# Patient Record
Sex: FEMALE | Race: WHITE | Employment: FULL TIME | URBAN - METROPOLITAN AREA
[De-identification: names, ages, dates, MRNs, and addresses within clinical notes are randomized per-mention and may not be internally consistent; named-entity substitution may affect disease eponyms.]

---

## 2017-01-05 ENCOUNTER — TELEPHONE (OUTPATIENT)
Dept: ORTHOPEDIC SURGERY | Age: 47
End: 2017-01-05

## 2017-06-14 ENCOUNTER — TELEPHONE (OUTPATIENT)
Dept: ORTHOPEDIC SURGERY | Age: 47
End: 2017-06-14

## 2017-06-19 ENCOUNTER — TELEPHONE (OUTPATIENT)
Dept: ORTHOPEDIC SURGERY | Age: 47
End: 2017-06-19

## 2017-06-27 ENCOUNTER — HOSPITAL ENCOUNTER (OUTPATIENT)
Dept: PAIN MANAGEMENT | Age: 47
Discharge: HOME | End: 2017-06-27
Attending: PHYSICAL MEDICINE & REHABILITATION | Admitting: PHYSICAL MEDICINE & REHABILITATION

## 2017-06-27 VITALS
BODY MASS INDEX: 37.76 KG/M2 | TEMPERATURE: 98.2 F | DIASTOLIC BLOOD PRESSURE: 89 MMHG | WEIGHT: 200 LBS | HEIGHT: 61 IN | HEART RATE: 99 BPM | OXYGEN SATURATION: 100 % | SYSTOLIC BLOOD PRESSURE: 150 MMHG | RESPIRATION RATE: 18 BRPM

## 2017-06-27 LAB — PREGNANCY, URINE: NEGATIVE

## 2017-06-27 ASSESSMENT — PAIN - FUNCTIONAL ASSESSMENT: PAIN_FUNCTIONAL_ASSESSMENT: 0-10

## 2017-07-27 ENCOUNTER — OFFICE VISIT (OUTPATIENT)
Dept: ORTHOPEDIC SURGERY | Age: 47
End: 2017-07-27

## 2017-07-27 VITALS
DIASTOLIC BLOOD PRESSURE: 90 MMHG | BODY MASS INDEX: 37.75 KG/M2 | HEIGHT: 61 IN | WEIGHT: 199.96 LBS | SYSTOLIC BLOOD PRESSURE: 122 MMHG | HEART RATE: 114 BPM

## 2017-07-27 DIAGNOSIS — M54.16 LUMBAR RADICULITIS: ICD-10-CM

## 2017-07-27 DIAGNOSIS — M48.061 LUMBAR STENOSIS: Primary | ICD-10-CM

## 2017-07-27 PROCEDURE — 99214 OFFICE O/P EST MOD 30 MIN: CPT | Performed by: PHYSICIAN ASSISTANT

## 2017-08-01 ENCOUNTER — TELEPHONE (OUTPATIENT)
Dept: ORTHOPEDIC SURGERY | Age: 47
End: 2017-08-01

## 2017-08-07 ENCOUNTER — HOSPITAL ENCOUNTER (OUTPATIENT)
Dept: PAIN MANAGEMENT | Age: 47
Discharge: HOME | End: 2017-08-07
Attending: PHYSICAL MEDICINE & REHABILITATION | Admitting: PHYSICAL MEDICINE & REHABILITATION

## 2017-08-07 VITALS
TEMPERATURE: 98.1 F | RESPIRATION RATE: 16 BRPM | SYSTOLIC BLOOD PRESSURE: 144 MMHG | DIASTOLIC BLOOD PRESSURE: 98 MMHG | OXYGEN SATURATION: 98 % | HEART RATE: 98 BPM

## 2017-08-07 LAB — PREGNANCY, URINE: NEGATIVE

## 2017-08-07 ASSESSMENT — PAIN - FUNCTIONAL ASSESSMENT: PAIN_FUNCTIONAL_ASSESSMENT: 0-10

## 2017-08-24 ENCOUNTER — OFFICE VISIT (OUTPATIENT)
Dept: ORTHOPEDIC SURGERY | Age: 47
End: 2017-08-24

## 2017-08-24 VITALS
WEIGHT: 199.96 LBS | DIASTOLIC BLOOD PRESSURE: 99 MMHG | HEART RATE: 105 BPM | BODY MASS INDEX: 37.75 KG/M2 | HEIGHT: 61 IN | SYSTOLIC BLOOD PRESSURE: 144 MMHG

## 2017-08-24 DIAGNOSIS — M48.061 LUMBAR STENOSIS: Primary | ICD-10-CM

## 2017-08-24 DIAGNOSIS — M54.16 LUMBAR RADICULITIS: ICD-10-CM

## 2017-08-24 PROCEDURE — 99214 OFFICE O/P EST MOD 30 MIN: CPT | Performed by: PHYSICIAN ASSISTANT

## 2017-08-28 ENCOUNTER — OFFICE VISIT (OUTPATIENT)
Dept: ORTHOPEDIC SURGERY | Age: 47
End: 2017-08-28

## 2017-08-28 VITALS
WEIGHT: 199.96 LBS | HEART RATE: 105 BPM | HEIGHT: 61 IN | DIASTOLIC BLOOD PRESSURE: 95 MMHG | SYSTOLIC BLOOD PRESSURE: 148 MMHG | BODY MASS INDEX: 37.75 KG/M2

## 2017-08-28 DIAGNOSIS — M48.062 LUMBAR STENOSIS WITH NEUROGENIC CLAUDICATION: Primary | ICD-10-CM

## 2017-08-28 PROCEDURE — 99243 OFF/OP CNSLTJ NEW/EST LOW 30: CPT | Performed by: ORTHOPAEDIC SURGERY

## 2017-08-29 ENCOUNTER — TELEPHONE (OUTPATIENT)
Dept: ORTHOPEDIC SURGERY | Age: 47
End: 2017-08-29

## 2017-09-08 ENCOUNTER — HOSPITAL ENCOUNTER (OUTPATIENT)
Dept: PAIN MANAGEMENT | Age: 47
Discharge: HOME | End: 2017-09-08
Attending: PHYSICAL MEDICINE & REHABILITATION | Admitting: PHYSICAL MEDICINE & REHABILITATION

## 2017-09-08 VITALS
HEART RATE: 99 BPM | RESPIRATION RATE: 16 BRPM | BODY MASS INDEX: 37.76 KG/M2 | WEIGHT: 200 LBS | SYSTOLIC BLOOD PRESSURE: 155 MMHG | HEIGHT: 61 IN | DIASTOLIC BLOOD PRESSURE: 98 MMHG | OXYGEN SATURATION: 98 % | TEMPERATURE: 97.2 F

## 2017-09-08 LAB — PREGNANCY, URINE: NEGATIVE

## 2017-09-08 ASSESSMENT — PAIN DESCRIPTION - DESCRIPTORS: DESCRIPTORS: ACHING;CONSTANT;DULL

## 2017-09-08 ASSESSMENT — PAIN - FUNCTIONAL ASSESSMENT
PAIN_FUNCTIONAL_ASSESSMENT: 0-10
PAIN_FUNCTIONAL_ASSESSMENT: 0-10

## 2017-09-27 ENCOUNTER — OFFICE VISIT (OUTPATIENT)
Dept: ORTHOPEDIC SURGERY | Age: 47
End: 2017-09-27

## 2017-09-27 VITALS
HEART RATE: 99 BPM | HEIGHT: 61 IN | DIASTOLIC BLOOD PRESSURE: 83 MMHG | WEIGHT: 199.96 LBS | BODY MASS INDEX: 37.75 KG/M2 | SYSTOLIC BLOOD PRESSURE: 131 MMHG

## 2017-09-27 DIAGNOSIS — M48.061 LUMBAR STENOSIS: Primary | ICD-10-CM

## 2017-09-27 DIAGNOSIS — M54.16 LUMBAR RADICULITIS: ICD-10-CM

## 2017-09-27 PROCEDURE — 99214 OFFICE O/P EST MOD 30 MIN: CPT | Performed by: PHYSICIAN ASSISTANT

## 2017-09-29 ENCOUNTER — TELEPHONE (OUTPATIENT)
Dept: ORTHOPEDIC SURGERY | Age: 47
End: 2017-09-29

## 2017-10-05 ENCOUNTER — TELEPHONE (OUTPATIENT)
Dept: ORTHOPEDIC SURGERY | Age: 47
End: 2017-10-05

## 2017-10-05 NOTE — TELEPHONE ENCOUNTER
Faxed [facility] medical records for FCV/MS for 1/2016 to present to Felecia Garcia MD @ [phone] (continuation of care)

## 2017-10-20 ENCOUNTER — HOSPITAL ENCOUNTER (OUTPATIENT)
Dept: MAMMOGRAPHY | Age: 47
Discharge: HOME | End: 2017-10-20
Attending: SURGERY | Admitting: SURGERY

## 2017-10-20 ENCOUNTER — HOSPITAL ENCOUNTER (OUTPATIENT)
Dept: MAMMOGRAPHY | Age: 47
Discharge: HOME | End: 2017-10-20

## 2017-10-20 DIAGNOSIS — N63.0 BREAST LUMP: ICD-10-CM

## 2018-05-29 ENCOUNTER — OFFICE VISIT (OUTPATIENT)
Dept: ORTHOPEDIC SURGERY | Age: 48
End: 2018-05-29

## 2018-05-29 VITALS — BODY MASS INDEX: 37.75 KG/M2 | HEIGHT: 61 IN | WEIGHT: 199.96 LBS

## 2018-05-29 DIAGNOSIS — M25.80 SESAMOIDITIS: Primary | ICD-10-CM

## 2018-05-29 DIAGNOSIS — M79.671 FOOT PAIN, RIGHT: ICD-10-CM

## 2018-05-29 PROCEDURE — G8427 DOCREV CUR MEDS BY ELIG CLIN: HCPCS | Performed by: PHYSICIAN ASSISTANT

## 2018-05-29 PROCEDURE — G8417 CALC BMI ABV UP PARAM F/U: HCPCS | Performed by: PHYSICIAN ASSISTANT

## 2018-05-29 PROCEDURE — L4361 PNEUMA/VAC WALK BOOT PRE OTS: HCPCS | Performed by: PHYSICIAN ASSISTANT

## 2018-05-29 PROCEDURE — 4004F PT TOBACCO SCREEN RCVD TLK: CPT | Performed by: PHYSICIAN ASSISTANT

## 2018-05-29 PROCEDURE — 99213 OFFICE O/P EST LOW 20 MIN: CPT | Performed by: PHYSICIAN ASSISTANT

## 2018-05-29 RX ORDER — MELOXICAM 15 MG/1
15 TABLET ORAL DAILY
Qty: 30 TABLET | Refills: 1 | Status: SHIPPED | OUTPATIENT
Start: 2018-05-29 | End: 2018-11-01

## 2018-05-30 ENCOUNTER — TELEPHONE (OUTPATIENT)
Dept: ORTHOPEDIC SURGERY | Age: 48
End: 2018-05-30

## 2018-06-13 ENCOUNTER — OFFICE VISIT (OUTPATIENT)
Dept: ORTHOPEDIC SURGERY | Age: 48
End: 2018-06-13

## 2018-06-13 VITALS — HEIGHT: 61 IN | BODY MASS INDEX: 37.76 KG/M2 | WEIGHT: 200 LBS

## 2018-06-13 DIAGNOSIS — M25.80 SESAMOIDITIS: Primary | ICD-10-CM

## 2018-06-13 PROCEDURE — G8427 DOCREV CUR MEDS BY ELIG CLIN: HCPCS | Performed by: PODIATRIST

## 2018-06-13 PROCEDURE — G8417 CALC BMI ABV UP PARAM F/U: HCPCS | Performed by: PODIATRIST

## 2018-06-13 PROCEDURE — 4004F PT TOBACCO SCREEN RCVD TLK: CPT | Performed by: PODIATRIST

## 2018-06-13 PROCEDURE — 99203 OFFICE O/P NEW LOW 30 MIN: CPT | Performed by: PODIATRIST

## 2018-06-15 ENCOUNTER — HOSPITAL ENCOUNTER (OUTPATIENT)
Dept: MAMMOGRAPHY | Age: 48
Discharge: HOME | End: 2018-06-15
Attending: SURGERY | Admitting: SURGERY

## 2018-06-15 DIAGNOSIS — Z80.3 FAMILY HISTORY OF BREAST CANCER: ICD-10-CM

## 2018-06-15 DIAGNOSIS — N63.10 LUMP OF RIGHT BREAST: ICD-10-CM

## 2018-10-09 ENCOUNTER — OFFICE VISIT (OUTPATIENT)
Dept: ORTHOPEDIC SURGERY | Age: 48
End: 2018-10-09
Payer: COMMERCIAL

## 2018-10-09 VITALS — WEIGHT: 199.96 LBS | HEIGHT: 61 IN | BODY MASS INDEX: 37.75 KG/M2

## 2018-10-09 DIAGNOSIS — M25.522 ELBOW PAIN, LEFT: Primary | ICD-10-CM

## 2018-10-09 DIAGNOSIS — M77.12 LATERAL EPICONDYLITIS OF LEFT ELBOW: ICD-10-CM

## 2018-10-09 PROCEDURE — G8484 FLU IMMUNIZE NO ADMIN: HCPCS | Performed by: PHYSICIAN ASSISTANT

## 2018-10-09 PROCEDURE — G8427 DOCREV CUR MEDS BY ELIG CLIN: HCPCS | Performed by: PHYSICIAN ASSISTANT

## 2018-10-09 PROCEDURE — 4004F PT TOBACCO SCREEN RCVD TLK: CPT | Performed by: PHYSICIAN ASSISTANT

## 2018-10-09 PROCEDURE — G8417 CALC BMI ABV UP PARAM F/U: HCPCS | Performed by: PHYSICIAN ASSISTANT

## 2018-10-09 PROCEDURE — 99213 OFFICE O/P EST LOW 20 MIN: CPT | Performed by: PHYSICIAN ASSISTANT

## 2018-10-09 RX ORDER — METHYLPREDNISOLONE 4 MG/1
TABLET ORAL
Qty: 1 KIT | Refills: 0 | Status: SHIPPED | OUTPATIENT
Start: 2018-10-09 | End: 2018-11-01

## 2018-10-11 NOTE — PROGRESS NOTES
She will follow-up with Dr. Sterling Antoine in 2 weeks for his continued evaluation and care. Fabiola Abbott PA-C    * Please note that some or all of this record was generated using voice recognition software. If there are any questions about the content of this document, please contact me as some errors in transcription may have occurred.

## 2018-11-01 ENCOUNTER — OFFICE VISIT (OUTPATIENT)
Dept: ORTHOPEDIC SURGERY | Age: 48
End: 2018-11-01
Payer: COMMERCIAL

## 2018-11-01 VITALS — BODY MASS INDEX: 37.75 KG/M2 | HEIGHT: 61 IN | WEIGHT: 199.96 LBS

## 2018-11-01 DIAGNOSIS — S56.911A FOREARM STRAIN, RIGHT, INITIAL ENCOUNTER: ICD-10-CM

## 2018-11-01 DIAGNOSIS — R20.0 NUMBNESS AND TINGLING IN LEFT HAND: ICD-10-CM

## 2018-11-01 DIAGNOSIS — M77.12 LATERAL EPICONDYLITIS OF LEFT ELBOW: Primary | ICD-10-CM

## 2018-11-01 DIAGNOSIS — R20.2 NUMBNESS AND TINGLING IN LEFT HAND: ICD-10-CM

## 2018-11-01 PROCEDURE — 99203 OFFICE O/P NEW LOW 30 MIN: CPT | Performed by: ORTHOPAEDIC SURGERY

## 2018-11-01 PROCEDURE — G8484 FLU IMMUNIZE NO ADMIN: HCPCS | Performed by: ORTHOPAEDIC SURGERY

## 2018-11-01 PROCEDURE — 4004F PT TOBACCO SCREEN RCVD TLK: CPT | Performed by: ORTHOPAEDIC SURGERY

## 2018-11-01 PROCEDURE — G8427 DOCREV CUR MEDS BY ELIG CLIN: HCPCS | Performed by: ORTHOPAEDIC SURGERY

## 2018-11-01 PROCEDURE — G8417 CALC BMI ABV UP PARAM F/U: HCPCS | Performed by: ORTHOPAEDIC SURGERY

## 2018-11-01 RX ORDER — NAPROXEN 500 MG/1
500 TABLET ORAL 2 TIMES DAILY WITH MEALS
Qty: 60 TABLET | Refills: 3 | Status: SHIPPED | OUTPATIENT
Start: 2018-11-01 | End: 2019-12-18

## 2018-11-01 NOTE — PROGRESS NOTES
HISTORY OF PRESENT ILLNESS: The patient is a 52-year-old right-hand-dominant female who presents today for a new hand surgery and elbow specialty evaluation regarding both arms. For over a year and a half she's had numbness and tingling into the left hand especially when driving or holding objects on the left. About 3 months ago she started to have left lateral elbow pain and did go to the after hours clinic and was diagnosed with lateral epicondylitis. She is continued to have discomfort despite using a forearm strap. She also reports that on the right posterior oh ulnar aspect of the forearm she's been having some tenderness. She states that she was treated in the past with tendinopathy of the right forearm. In fact, I think I may have seen her a decade ago for this problem. She has been favoring the arm and overusing the right side. PAST MEDICAL HISTORY: Patient's medications, allergies, past medical, surgical, social and family histories were reviewed and updated as appropriate. ROS: Pertinent items are noted in HPI. Review of systems reviewed from patient history form dated on 10/9/2018 and available in the patient's chart under the media tab. Denies fever, chills, confusion, bowel/bladder active change. PHYSICAL EXAMINATION: Examination reveals a pleasant individual in no acute distress. There appears to be satisfactory pain-free range of motion, strength, and stability of the cervical spine, shoulders, wrists, and hands. Skin is intact without lymphadenopathy, discoloration, or abnormal temperature. There is intact, symmetric circulation in both upper extremities. At the left elbow, there is tenderness at the extremes of maximum flexion and extension and pain with firm pressure at the common extensor origin. There is no dramatic tenderness at the radial tunnel. Pain is reproduced with resisted wrist and long finger extension. There is no elbow instability or mechanical locking.  There is

## 2018-11-14 ENCOUNTER — HOSPITAL ENCOUNTER (OUTPATIENT)
Dept: OCCUPATIONAL THERAPY | Age: 48
Setting detail: THERAPIES SERIES
Discharge: HOME | End: 2018-11-14
Payer: COMMERCIAL

## 2018-11-14 PROCEDURE — 97035 APP MDLTY 1+ULTRASOUND EA 15: CPT | Performed by: OCCUPATIONAL THERAPIST

## 2018-11-14 PROCEDURE — 97110 THERAPEUTIC EXERCISES: CPT | Performed by: OCCUPATIONAL THERAPIST

## 2018-11-14 PROCEDURE — 97140 MANUAL THERAPY 1/> REGIONS: CPT | Performed by: OCCUPATIONAL THERAPIST

## 2018-11-14 NOTE — FLOWSHEET NOTE
Patient tolerated treatment well [] Patient limited by fatigue  [x] Patient limited by pain  [] Patient limited by other medical complications  [] Other:     Prognosis: [x] Good [] Fair  [] Poor    Patient Requires Follow-up: [x] Yes  [] No    PLAN:   [x] Continue per plan of care [] Alter current plan (see comments)  [] Plan of care initiated [] Hold pending MD visit [] Discharge    Electronically signed by: Trisha Cerna OTR/L, [address] [place] [address]

## 2018-11-16 ENCOUNTER — OFFICE VISIT (OUTPATIENT)
Dept: ORTHOPEDIC SURGERY | Age: 48
End: 2018-11-16
Payer: COMMERCIAL

## 2018-11-16 DIAGNOSIS — G56.02 CARPAL TUNNEL SYNDROME OF LEFT WRIST: Primary | ICD-10-CM

## 2018-11-16 PROCEDURE — 95908 NRV CNDJ TST 3-4 STUDIES: CPT | Performed by: PHYSICAL MEDICINE & REHABILITATION

## 2018-11-16 PROCEDURE — 95886 MUSC TEST DONE W/N TEST COMP: CPT | Performed by: PHYSICAL MEDICINE & REHABILITATION

## 2018-11-19 ENCOUNTER — OFFICE VISIT (OUTPATIENT)
Dept: ORTHOPEDIC SURGERY | Age: 48
End: 2018-11-19
Payer: COMMERCIAL

## 2018-11-19 VITALS — BODY MASS INDEX: 37.75 KG/M2 | HEIGHT: 61 IN | WEIGHT: 199.96 LBS

## 2018-11-19 DIAGNOSIS — R20.0 NUMBNESS AND TINGLING IN LEFT HAND: Primary | ICD-10-CM

## 2018-11-19 DIAGNOSIS — G56.02 LEFT CARPAL TUNNEL SYNDROME: ICD-10-CM

## 2018-11-19 DIAGNOSIS — R20.2 NUMBNESS AND TINGLING IN LEFT HAND: Primary | ICD-10-CM

## 2018-11-19 PROCEDURE — G8427 DOCREV CUR MEDS BY ELIG CLIN: HCPCS | Performed by: ORTHOPAEDIC SURGERY

## 2018-11-19 PROCEDURE — G8484 FLU IMMUNIZE NO ADMIN: HCPCS | Performed by: ORTHOPAEDIC SURGERY

## 2018-11-19 PROCEDURE — 99213 OFFICE O/P EST LOW 20 MIN: CPT | Performed by: ORTHOPAEDIC SURGERY

## 2018-11-19 PROCEDURE — L3908 WHO COCK-UP NONMOLDE PRE OTS: HCPCS | Performed by: ORTHOPAEDIC SURGERY

## 2018-11-19 PROCEDURE — 4004F PT TOBACCO SCREEN RCVD TLK: CPT | Performed by: ORTHOPAEDIC SURGERY

## 2018-11-19 PROCEDURE — G8417 CALC BMI ABV UP PARAM F/U: HCPCS | Performed by: ORTHOPAEDIC SURGERY

## 2018-11-26 PROBLEM — M51.26 DISPLACEMENT OF LUMBAR INTERVERTEBRAL DISC WITHOUT MYELOPATHY: Status: ACTIVE | Noted: 2018-11-26

## 2018-11-26 PROBLEM — M47.817 LUMBOSACRAL SPONDYLOSIS WITHOUT MYELOPATHY: Chronic | Status: ACTIVE | Noted: 2018-11-26

## 2018-11-26 PROBLEM — M47.817 LUMBOSACRAL SPONDYLOSIS WITHOUT MYELOPATHY: Status: ACTIVE | Noted: 2018-11-26

## 2018-11-26 PROBLEM — M51.379 DEGENERATION OF LUMBAR OR LUMBOSACRAL INTERVERTEBRAL DISC: Chronic | Status: ACTIVE | Noted: 2018-11-26

## 2018-11-26 PROBLEM — M51.26 DISPLACEMENT OF LUMBAR INTERVERTEBRAL DISC WITHOUT MYELOPATHY: Chronic | Status: ACTIVE | Noted: 2018-11-26

## 2018-11-26 PROBLEM — M51.379 DEGENERATION OF LUMBAR OR LUMBOSACRAL INTERVERTEBRAL DISC: Status: ACTIVE | Noted: 2018-11-26

## 2018-11-26 PROBLEM — M51.37 DEGENERATION OF LUMBAR OR LUMBOSACRAL INTERVERTEBRAL DISC: Status: ACTIVE | Noted: 2018-11-26

## 2018-11-26 PROBLEM — M51.37 DEGENERATION OF LUMBAR OR LUMBOSACRAL INTERVERTEBRAL DISC: Chronic | Status: ACTIVE | Noted: 2018-11-26

## 2018-11-28 ENCOUNTER — HOSPITAL ENCOUNTER (OUTPATIENT)
Dept: OCCUPATIONAL THERAPY | Age: 48
Setting detail: THERAPIES SERIES
Discharge: HOME | End: 2018-11-28
Payer: COMMERCIAL

## 2018-11-28 PROCEDURE — 97110 THERAPEUTIC EXERCISES: CPT | Performed by: OCCUPATIONAL THERAPIST

## 2018-11-28 PROCEDURE — 97035 APP MDLTY 1+ULTRASOUND EA 15: CPT | Performed by: OCCUPATIONAL THERAPIST

## 2018-11-28 PROCEDURE — 97140 MANUAL THERAPY 1/> REGIONS: CPT | Performed by: OCCUPATIONAL THERAPIST

## 2018-11-28 NOTE — FLOWSHEET NOTE
and NMR:  [x] (71040) Provided verbal/tactile cueing for activities related to strengthening, flexibility, endurance, ROM  for improvements in scapular, scapulothoracic and UE control with self care, reaching, carrying, lifting, house/yardwork, driving/computer work.    [] (65902) Provided verbal/tactile cueing for activities related to improving balance, coordination, kinesthetic sense, posture, motor skill, proprioception  to assist with  scapular, scapulothoracic and UE control with self care, reaching, carrying, lifting, house/yardwork, driving/computer work. [] Comments:    Therapeutic Activities:    [x] (61508 or 11807) Provided verbal/tactile cueing for activities related to improving balance, coordination, kinesthetic sense, posture, motor skill, proprioception and motor activation to allow for proper function of scapular, scapulothoracic and UE control with self care, carrying, lifting, driving/computer work  [] Comments:    Home Exercise Program:    [x] (51762) Reviewed/Progressed HEP activities related to strengthening, flexibility, endurance, ROM of scapular, scapulothoracic and UE control with self care, reaching, carrying, lifting, house/yardwork, driving/computer work  [] (89683) Reviewed/Progressed HEP activities related to improving balance, coordination, kinesthetic sense, posture, motor skill, proprioception of scapular, scapulothoracic and UE control with self care, reaching, carrying, lifting, house/yardwork, driving/computer work    [x] Comments:Forearm stretches, ice use. Avoid palm down lifting.     Manual Treatments:  PROM / STM / Oscillations-Mobs:  G-I, II, III, IV (PA's, Inf., Post.)  [x] (07920) Provided manual therapy to mobilize soft tissue/joints of cervical/CT, scapular GHJ and UE for the purpose of modulating pain, promoting relaxation,  increasing ROM, reducing/eliminating soft tissue swelling/inflammation/restriction, improving soft tissue extensibility and allowing for proper ROM

## 2018-12-05 ENCOUNTER — HOSPITAL ENCOUNTER (OUTPATIENT)
Dept: OCCUPATIONAL THERAPY | Age: 48
Setting detail: THERAPIES SERIES
Discharge: HOME | End: 2018-12-05
Payer: COMMERCIAL

## 2018-12-05 PROCEDURE — 97140 MANUAL THERAPY 1/> REGIONS: CPT | Performed by: OCCUPATIONAL THERAPIST

## 2018-12-05 PROCEDURE — 97035 APP MDLTY 1+ULTRASOUND EA 15: CPT | Performed by: OCCUPATIONAL THERAPIST

## 2018-12-05 PROCEDURE — 97110 THERAPEUTIC EXERCISES: CPT | Performed by: OCCUPATIONAL THERAPIST

## 2018-12-12 ENCOUNTER — HOSPITAL ENCOUNTER (OUTPATIENT)
Dept: OCCUPATIONAL THERAPY | Age: 48
Setting detail: THERAPIES SERIES
Discharge: HOME | End: 2018-12-12
Payer: COMMERCIAL

## 2018-12-12 DIAGNOSIS — M77.12 LATERAL EPICONDYLITIS OF LEFT ELBOW: Primary | ICD-10-CM

## 2018-12-12 PROCEDURE — 97140 MANUAL THERAPY 1/> REGIONS: CPT | Performed by: OCCUPATIONAL THERAPIST

## 2018-12-12 PROCEDURE — 97110 THERAPEUTIC EXERCISES: CPT | Performed by: OCCUPATIONAL THERAPIST

## 2018-12-12 PROCEDURE — 97035 APP MDLTY 1+ULTRASOUND EA 15: CPT | Performed by: OCCUPATIONAL THERAPIST

## 2018-12-12 RX ORDER — DEXAMETHASONE SODIUM PHOSPHATE 4 MG/ML
INJECTION, SOLUTION INTRA-ARTICULAR; INTRALESIONAL; INTRAMUSCULAR; INTRAVENOUS; SOFT TISSUE
Qty: 30 ML | Refills: 0 | Status: SHIPPED | OUTPATIENT
Start: 2018-12-12 | End: 2019-12-18

## 2018-12-12 NOTE — FLOWSHEET NOTE
2# x10 2# x10   Pilates ring   Pulse x15 Pulse x15 Pulse x15             Therapeutic Exercise and NMR:  [x] (13542) Provided verbal/tactile cueing for activities related to strengthening, flexibility, endurance, ROM  for improvements in scapular, scapulothoracic and UE control with self care, reaching, carrying, lifting, house/yardwork, driving/computer work.    [] (38893) Provided verbal/tactile cueing for activities related to improving balance, coordination, kinesthetic sense, posture, motor skill, proprioception  to assist with  scapular, scapulothoracic and UE control with self care, reaching, carrying, lifting, house/yardwork, driving/computer work. [] Comments:    Therapeutic Activities:    [x] (54809 or 99755) Provided verbal/tactile cueing for activities related to improving balance, coordination, kinesthetic sense, posture, motor skill, proprioception and motor activation to allow for proper function of scapular, scapulothoracic and UE control with self care, carrying, lifting, driving/computer work  [] Comments:    Home Exercise Program:    [x] (41103) Reviewed/Progressed HEP activities related to strengthening, flexibility, endurance, ROM of scapular, scapulothoracic and UE control with self care, reaching, carrying, lifting, house/yardwork, driving/computer work  [] (15499) Reviewed/Progressed HEP activities related to improving balance, coordination, kinesthetic sense, posture, motor skill, proprioception of scapular, scapulothoracic and UE control with self care, reaching, carrying, lifting, house/yardwork, driving/computer work    [x] Comments:Forearm stretches, ice use. Avoid palm down lifting.     Manual Treatments:  PROM / STM / Oscillations-Mobs:  G-I, II, III, IV (PA's, Inf., Post.)  [x] (64062) Provided manual therapy to mobilize soft tissue/joints of cervical/CT, scapular GHJ and UE for the purpose of modulating pain, promoting relaxation,  increasing ROM, reducing/eliminating soft tissue swelling/inflammation/restriction, improving soft tissue extensibility and allowing for proper ROM for normal function with self care, reaching, carrying, lifting, house/yardwork, driving/computer work  [x] Comments: Extensor compartment stretches, radial musculature massage    Splinting:  [] Fabrication of:   [] (61317) Checkout for orthotic/prosthetic use, established patient   [] (85963) Orthotic management and training (fitting and assessment)  [] Comments:    Charges:  Timed Code Treatment Minutes: 40   Total Treatment Minutes: 40     [] EVAL (LOW) 59147   [] OT Re-eval (13513)  [] EVAL (MOD) 45415   [] EVAL (HIGH) 96391       [x] Carmina (70987) x  1   [] OSTVS(57832)  [] NMR (11479) x      [] Estim (attended) (77091)   [x] Manual ([id number]) x  1    [x] US (30320)  [] TA (83497) x      [] Paraffin (21080)  [] ADL  (01042) x     [] Splint/L code:    [] Estim (unattended) (37035)  [] Other:    GOALS: Long Term Goals to be achieved in 6  weeks, including patient directed goals to address identified performance deficits:  1) Pt to be independent in graded HEP progression with a good level of effort and compliance. 2) Pt to report a score of 40 % or less on the Quick DASH disability questionnaire for increased performance with carrying, moving, and handling objects. 3) Pt will demonstrate increased ROM of elbow by 20 pain free degrees for improved performance of reaching, typing  4) Pt will demonstrate an increase in gross grasp strength to 75% of uninvolved hand for improvement of driving, household chores  5) Pt will have a decrease in pain to 4/10 with use to facilitate improvement in strength, movement, function, and ADLs. Progression Towards Functional goals:   [] Patient is progressing as expected towards functional goals listed. [] Progression is slowed due to complexities listed. [] Progression has been slowed due to co-morbidities.   [x] Plan just implemented, too soon to assess goals progression  []

## 2018-12-17 ENCOUNTER — HOSPITAL ENCOUNTER (OUTPATIENT)
Dept: OCCUPATIONAL THERAPY | Age: 48
Setting detail: THERAPIES SERIES
Discharge: HOME | End: 2018-12-17
Payer: COMMERCIAL

## 2018-12-17 PROCEDURE — 97033 APP MDLTY 1+IONTPHRSIS EA 15: CPT | Performed by: OCCUPATIONAL THERAPIST

## 2018-12-17 NOTE — FLOWSHEET NOTE
[address] [place], [address]                Hand Therapy Daily Treatment Note  Date:  2018    Patient: Familia Thomas   : 1970   MRN: [id number]  Referring Physician: Referring Practitioner: Dr. Murali Gimenez Diagnosis Information:  Diagnosis: L lateral epicondylitis (M77.12)                                          Treatment Diagnosis: M25.522                                    Insurance information: OT Insurance Information: [facility]    Date of Injury: Approx 2 months ago    Visit # Insurance Allowable   5 60, $0 copay     Date of Patient follow up with Physician: as needed in January    G-Code:  OT G-codes  Functional Assessment Tool Used: quick DASH - 59%  Functional Limitation: Carrying, moving and handling objects  Carrying, Moving and Handling Objects Current Status (): At least 40 percent but less than 60 percent impaired, limited or restricted  Carrying, Moving and Handling Objects Goal Status (): At least 20 percent but less than 40 percent impaired, limited or restricted    Progress Note: []  Yes  [x]  No  Next due by: Visit #10      Latex Allergy:  [x]NO      []YES    Preferred Language for Healthcare:   [x]English       []other:    Pain level:  2/10 resting, 7-8/10 with lifting     SUBJECTIVE:  Forgot to wear CF band today; some additional discomfort at elbow.     RESTRICTIONS/PRECAUTIONS: activity to tolerance     OBJECTIVE:          Date:  18   Objective Measures:          #2 R64 L30 pain free L30    L20 pain free                     Modalities:         MHP 10'        US  8' lat elbow 8' lat elbow 8' lat elbow 8' lat elbow    Ionto      24hr patch   Therapeutic Exercise, Activities, NMR:         HEP/pt education 25' review review      Radial musc massage/stretch  8' 8'      Wrist roller   6' 6'     [employer] - wrist

## 2018-12-19 ENCOUNTER — HOSPITAL ENCOUNTER (OUTPATIENT)
Dept: OCCUPATIONAL THERAPY | Age: 48
Setting detail: THERAPIES SERIES
Discharge: HOME | End: 2018-12-19
Payer: COMMERCIAL

## 2018-12-19 PROCEDURE — 97033 APP MDLTY 1+IONTPHRSIS EA 15: CPT | Performed by: OCCUPATIONAL THERAPIST

## 2018-12-19 NOTE — FLOWSHEET NOTE
x10     Pilates ring   Pulse x15 Pulse x15 Pulse x15                 Therapeutic Exercise and NMR:  [x] (23920) Provided verbal/tactile cueing for activities related to strengthening, flexibility, endurance, ROM  for improvements in scapular, scapulothoracic and UE control with self care, reaching, carrying, lifting, house/yardwork, driving/computer work.    [] (83758) Provided verbal/tactile cueing for activities related to improving balance, coordination, kinesthetic sense, posture, motor skill, proprioception  to assist with  scapular, scapulothoracic and UE control with self care, reaching, carrying, lifting, house/yardwork, driving/computer work. [] Comments:    Therapeutic Activities:    [x] (53708 or 03238) Provided verbal/tactile cueing for activities related to improving balance, coordination, kinesthetic sense, posture, motor skill, proprioception and motor activation to allow for proper function of scapular, scapulothoracic and UE control with self care, carrying, lifting, driving/computer work  [] Comments:    Home Exercise Program:    [x] (85118) Reviewed/Progressed HEP activities related to strengthening, flexibility, endurance, ROM of scapular, scapulothoracic and UE control with self care, reaching, carrying, lifting, house/yardwork, driving/computer work  [] (18950) Reviewed/Progressed HEP activities related to improving balance, coordination, kinesthetic sense, posture, motor skill, proprioception of scapular, scapulothoracic and UE control with self care, reaching, carrying, lifting, house/yardwork, driving/computer work    [x] Comments:Forearm stretches, ice use. Avoid palm down lifting.     Manual Treatments:  PROM / STM / Oscillations-Mobs:  G-I, II, III, IV (PA's, Inf., Post.)  [] (41500) Provided manual therapy to mobilize soft tissue/joints of cervical/CT, scapular GHJ and UE for the purpose of modulating pain, promoting relaxation,  increasing ROM, reducing/eliminating soft tissue twist    Treatment/Activity Tolerance:  [x] Patient tolerated treatment well [] Patient limited by fatigue  [] Patient limited by pain  [] Patient limited by other medical complications  [] Other:     Prognosis: [x] Good [] Fair  [] Poor    Patient Requires Follow-up: [x] Yes  [] No    PLAN:    [x] Continue per plan of care [] Alter current plan (see comments)  [] Plan of care initiated [] Hold pending MD visit [] Discharge    Electronically signed by: NEMO Clemons/AGATHA, [address] [place] [address]

## 2018-12-26 ENCOUNTER — HOSPITAL ENCOUNTER (OUTPATIENT)
Dept: OCCUPATIONAL THERAPY | Age: 48
Setting detail: THERAPIES SERIES
Discharge: HOME | End: 2018-12-26
Payer: COMMERCIAL

## 2018-12-26 PROCEDURE — 97033 APP MDLTY 1+IONTPHRSIS EA 15: CPT | Performed by: OCCUPATIONAL THERAPIST

## 2018-12-26 NOTE — FLOWSHEET NOTE
[address] [place], [address]                Hand Therapy Daily Treatment Note  Date:  2018    Patient: Familia Thomas   : 1970   MRN: [id number]  Referring Physician: Referring Practitioner: Dr. Murali Gimenez Diagnosis Information:  Diagnosis: L lateral epicondylitis (M77.12)                                          Treatment Diagnosis: M25.522                                    Insurance information: OT Insurance Information: [facility]    Date of Injury: Approx 2 months ago    Visit # Insurance Allowable   6 60, $0 copay     Date of Patient follow up with Physician: as needed in January    G-Code:  OT G-codes  Functional Assessment Tool Used: quick DASH - 59%  Functional Limitation: Carrying, moving and handling objects  Carrying, Moving and Handling Objects Current Status (): At least 40 percent but less than 60 percent impaired, limited or restricted  Carrying, Moving and Handling Objects Goal Status (): At least 20 percent but less than 40 percent impaired, limited or restricted    Progress Note: []  Yes  [x]  No  Next due by: Visit #10      Latex Allergy:  [x]NO      []YES    Preferred Language for Healthcare:   [x]English       []other:    Pain level:  2/10 resting, 7-8/10 with lifting     SUBJECTIVE:  No overall changes. Still most painful with lifting.     RESTRICTIONS/PRECAUTIONS: activity to tolerance     OBJECTIVE:          Date:  18   Objective Measures:            #2 R64 L30 pain free L30    L20 pain free  L22 pain free                         Modalities:           MHP 10'          US  8' lat elbow 8' lat elbow 8' lat elbow 8' lat elbow      Ionto      24hr patch 24hr patch 24 hr patch   Therapeutic Exercise, Activities, NMR:           HEP/pt education 25' review review        Radial musc massage/stretch progression  [] Other:     ASSESSMENT: Immediate pain with powerweb twist    Treatment/Activity Tolerance:  [x] Patient tolerated treatment well [] Patient limited by fatigue  [] Patient limited by pain  [] Patient limited by other medical complications  [] Other:     Prognosis: [x] Good [] Fair  [] Poor    Patient Requires Follow-up: [x] Yes  [] No    PLAN:    [x] Continue per plan of care [] Alter current plan (see comments)  [] Plan of care initiated [] Hold pending MD visit [] Discharge    Electronically signed by: Julio Dejesus OTR/L, [address] [place] [address]

## 2019-01-02 ENCOUNTER — HOSPITAL ENCOUNTER (OUTPATIENT)
Dept: OCCUPATIONAL THERAPY | Age: 49
Setting detail: THERAPIES SERIES
Discharge: HOME | End: 2019-01-02
Payer: COMMERCIAL

## 2019-01-02 PROCEDURE — 97033 APP MDLTY 1+IONTPHRSIS EA 15: CPT | Performed by: OCCUPATIONAL THERAPIST

## 2019-01-07 ENCOUNTER — HOSPITAL ENCOUNTER (OUTPATIENT)
Dept: OCCUPATIONAL THERAPY | Age: 49
Setting detail: THERAPIES SERIES
Discharge: HOME | End: 2019-01-07
Payer: COMMERCIAL

## 2019-01-07 PROCEDURE — 97033 APP MDLTY 1+IONTPHRSIS EA 15: CPT | Performed by: OCCUPATIONAL THERAPIST

## 2019-01-10 ENCOUNTER — HOSPITAL ENCOUNTER (OUTPATIENT)
Age: 49
Setting detail: OUTPATIENT SURGERY
Discharge: HOME | End: 2019-01-10
Attending: PAIN MEDICINE | Admitting: PAIN MEDICINE
Payer: COMMERCIAL

## 2019-01-10 VITALS
TEMPERATURE: 97.2 F | OXYGEN SATURATION: 96 % | RESPIRATION RATE: 20 BRPM | HEART RATE: 99 BPM | DIASTOLIC BLOOD PRESSURE: 99 MMHG | SYSTOLIC BLOOD PRESSURE: 153 MMHG

## 2019-01-10 LAB — PREGNANCY, URINE: NEGATIVE

## 2019-01-10 PROCEDURE — 2500000003 HC RX 250 WO HCPCS: Performed by: PAIN MEDICINE

## 2019-01-10 PROCEDURE — 99152 MOD SED SAME PHYS/QHP 5/>YRS: CPT | Performed by: PAIN MEDICINE

## 2019-01-10 PROCEDURE — 64636 DESTROY L/S FACET JNT ADDL: CPT | Performed by: PAIN MEDICINE

## 2019-01-10 PROCEDURE — 7100000011 HC PHASE II RECOVERY - ADDTL 15 MIN: Performed by: PAIN MEDICINE

## 2019-01-10 PROCEDURE — 64635 DESTROY LUMB/SAC FACET JNT: CPT | Performed by: PAIN MEDICINE

## 2019-01-10 PROCEDURE — 3600000012 HC SURGERY LEVEL 2 ADDTL 15MIN: Performed by: PAIN MEDICINE

## 2019-01-10 PROCEDURE — 3600000002 HC SURGERY LEVEL 2 BASE: Performed by: PAIN MEDICINE

## 2019-01-10 PROCEDURE — 7100000010 HC PHASE II RECOVERY - FIRST 15 MIN: Performed by: PAIN MEDICINE

## 2019-01-10 PROCEDURE — 2580000003 HC RX 258: Performed by: PAIN MEDICINE

## 2019-01-10 PROCEDURE — 84703 CHORIONIC GONADOTROPIN ASSAY: CPT

## 2019-01-10 PROCEDURE — 6360000002 HC RX W HCPCS: Performed by: PAIN MEDICINE

## 2019-01-10 PROCEDURE — 77003 FLUOROGUIDE FOR SPINE INJECT: CPT | Performed by: PAIN MEDICINE

## 2019-01-10 PROCEDURE — 2709999900 HC NON-CHARGEABLE SUPPLY: Performed by: PAIN MEDICINE

## 2019-01-10 RX ORDER — MIDAZOLAM HYDROCHLORIDE 5 MG/ML
INJECTION INTRAMUSCULAR; INTRAVENOUS PRN
Status: DISCONTINUED | OUTPATIENT
Start: 2019-01-10 | End: 2019-01-10 | Stop reason: HOSPADM

## 2019-01-10 RX ORDER — LIDOCAINE HYDROCHLORIDE 20 MG/ML
INJECTION, SOLUTION EPIDURAL; INFILTRATION; INTRACAUDAL; PERINEURAL PRN
Status: DISCONTINUED | OUTPATIENT
Start: 2019-01-10 | End: 2019-01-10 | Stop reason: HOSPADM

## 2019-01-10 RX ORDER — LIDOCAINE HYDROCHLORIDE 10 MG/ML
INJECTION, SOLUTION EPIDURAL; INFILTRATION; INTRACAUDAL; PERINEURAL PRN
Status: DISCONTINUED | OUTPATIENT
Start: 2019-01-10 | End: 2019-01-10 | Stop reason: HOSPADM

## 2019-01-10 RX ORDER — FENTANYL CITRATE 50 UG/ML
INJECTION, SOLUTION INTRAMUSCULAR; INTRAVENOUS PRN
Status: DISCONTINUED | OUTPATIENT
Start: 2019-01-10 | End: 2019-01-10 | Stop reason: HOSPADM

## 2019-01-10 RX ORDER — SODIUM CHLORIDE, SODIUM LACTATE, POTASSIUM CHLORIDE, CALCIUM CHLORIDE 600; 310; 30; 20 MG/100ML; MG/100ML; MG/100ML; MG/100ML
INJECTION, SOLUTION INTRAVENOUS CONTINUOUS
Status: DISCONTINUED | OUTPATIENT
Start: 2019-01-10 | End: 2019-01-10 | Stop reason: HOSPADM

## 2019-01-10 RX ADMIN — LIDOCAINE HYDROCHLORIDE 0.1 ML: 10 INJECTION, SOLUTION EPIDURAL; INFILTRATION; INTRACAUDAL; PERINEURAL at 11:48

## 2019-01-10 RX ADMIN — SODIUM CHLORIDE, POTASSIUM CHLORIDE, SODIUM LACTATE AND CALCIUM CHLORIDE: 600; 310; 30; 20 INJECTION, SOLUTION INTRAVENOUS at 11:48

## 2019-01-10 ASSESSMENT — PAIN - FUNCTIONAL ASSESSMENT
PAIN_FUNCTIONAL_ASSESSMENT: 0-10
PAIN_FUNCTIONAL_ASSESSMENT: PREVENTS OR INTERFERES SOME ACTIVE ACTIVITIES AND ADLS

## 2019-02-19 ENCOUNTER — HOSPITAL ENCOUNTER (OUTPATIENT)
Age: 49
Discharge: HOME | End: 2019-02-19
Payer: COMMERCIAL

## 2019-02-19 LAB
BASOPHILS ABSOLUTE: 0.1 K/UL (ref 0–0.2)
BASOPHILS RELATIVE PERCENT: 0.8 %
EOSINOPHILS ABSOLUTE: 0.1 K/UL (ref 0–0.6)
EOSINOPHILS RELATIVE PERCENT: 0.8 %
HCT VFR BLD CALC: 46.2 % (ref 36–48)
HEMOGLOBIN: 15.5 G/DL (ref 12–16)
LYMPHOCYTES ABSOLUTE: 3.1 K/UL (ref 1–5.1)
LYMPHOCYTES RELATIVE PERCENT: 29.6 %
MCH RBC QN AUTO: 28.3 PG (ref 26–34)
MCHC RBC AUTO-ENTMCNC: 33.6 G/DL (ref 31–36)
MCV RBC AUTO: 84.2 FL (ref 80–100)
MONOCYTES ABSOLUTE: 0.5 K/UL (ref 0–1.3)
MONOCYTES RELATIVE PERCENT: 4.9 %
NEUTROPHILS ABSOLUTE: 6.6 K/UL (ref 1.7–7.7)
NEUTROPHILS RELATIVE PERCENT: 63.9 %
PDW BLD-RTO: 14.2 % (ref 12.4–15.4)
PLATELET # BLD: 324 K/UL (ref 135–450)
PMV BLD AUTO: 7 FL (ref 5–10.5)
RBC # BLD: 5.49 M/UL (ref 4–5.2)
WBC # BLD: 10.4 K/UL (ref 4–11)

## 2019-02-19 PROCEDURE — 85025 COMPLETE CBC W/AUTO DIFF WBC: CPT

## 2019-02-19 PROCEDURE — 36415 COLL VENOUS BLD VENIPUNCTURE: CPT

## 2019-06-21 ENCOUNTER — HOSPITAL ENCOUNTER (OUTPATIENT)
Dept: WOMENS IMAGING | Age: 49
Discharge: HOME | End: 2019-06-21
Payer: COMMERCIAL

## 2019-06-21 DIAGNOSIS — Z12.31 ENCOUNTER FOR SCREENING MAMMOGRAM FOR MALIGNANT NEOPLASM OF BREAST: ICD-10-CM

## 2019-06-21 PROCEDURE — 77067 SCR MAMMO BI INCL CAD: CPT

## 2019-08-01 ENCOUNTER — OFFICE VISIT (OUTPATIENT)
Dept: ORTHOPEDIC SURGERY | Age: 49
End: 2019-08-01
Payer: COMMERCIAL

## 2019-08-01 VITALS — RESPIRATION RATE: 16 BRPM | HEIGHT: 61 IN | BODY MASS INDEX: 37.75 KG/M2 | WEIGHT: 199.96 LBS

## 2019-08-01 DIAGNOSIS — M25.531 RIGHT WRIST PAIN: Primary | ICD-10-CM

## 2019-08-01 DIAGNOSIS — M77.8 RIGHT WRIST TENDINITIS: ICD-10-CM

## 2019-08-01 PROCEDURE — 99214 OFFICE O/P EST MOD 30 MIN: CPT | Performed by: ORTHOPAEDIC SURGERY

## 2019-08-01 PROCEDURE — 4004F PT TOBACCO SCREEN RCVD TLK: CPT | Performed by: ORTHOPAEDIC SURGERY

## 2019-08-01 PROCEDURE — G8427 DOCREV CUR MEDS BY ELIG CLIN: HCPCS | Performed by: ORTHOPAEDIC SURGERY

## 2019-08-01 PROCEDURE — G8417 CALC BMI ABV UP PARAM F/U: HCPCS | Performed by: ORTHOPAEDIC SURGERY

## 2019-08-01 PROCEDURE — 20550 NJX 1 TENDON SHEATH/LIGAMENT: CPT | Performed by: ORTHOPAEDIC SURGERY

## 2019-09-27 ENCOUNTER — HOSPITAL ENCOUNTER (OUTPATIENT)
Age: 49
Discharge: HOME | End: 2019-09-27
Payer: COMMERCIAL

## 2019-09-27 LAB
A/G RATIO: 1.5 (ref 1.1–2.2)
ALBUMIN SERPL-MCNC: 4.7 G/DL (ref 3.4–5)
ALP BLD-CCNC: 96 U/L (ref 40–129)
ALT SERPL-CCNC: 9 U/L (ref 10–40)
ANION GAP SERPL CALCULATED.3IONS-SCNC: 15 MMOL/L (ref 3–16)
AST SERPL-CCNC: 15 U/L (ref 15–37)
BILIRUB SERPL-MCNC: 0.4 MG/DL (ref 0–1)
BUN BLDV-MCNC: 11 MG/DL (ref 7–20)
CALCIUM SERPL-MCNC: 9.8 MG/DL (ref 8.3–10.6)
CHLORIDE BLD-SCNC: 101 MMOL/L (ref 99–110)
CHOLESTEROL, TOTAL: 247 MG/DL (ref 0–199)
CO2: 25 MMOL/L (ref 21–32)
CREAT SERPL-MCNC: <0.5 MG/DL (ref 0.6–1.1)
GFR AFRICAN AMERICAN: >60
GFR NON-AFRICAN AMERICAN: >60
GLOBULIN: 3.1 G/DL
GLUCOSE BLD-MCNC: 127 MG/DL (ref 70–99)
HCT VFR BLD CALC: 46.4 % (ref 36–48)
HDLC SERPL-MCNC: 34 MG/DL (ref 40–60)
HEMOGLOBIN: 15.7 G/DL (ref 12–16)
LDL CHOLESTEROL CALCULATED: 154 MG/DL
LIPASE: 16 U/L (ref 13–60)
MCH RBC QN AUTO: 28.4 PG (ref 26–34)
MCHC RBC AUTO-ENTMCNC: 33.9 G/DL (ref 31–36)
MCV RBC AUTO: 83.7 FL (ref 80–100)
PDW BLD-RTO: 14 % (ref 12.4–15.4)
PLATELET # BLD: 336 K/UL (ref 135–450)
PMV BLD AUTO: 7.1 FL (ref 5–10.5)
POTASSIUM SERPL-SCNC: 4.2 MMOL/L (ref 3.5–5.1)
RBC # BLD: 5.54 M/UL (ref 4–5.2)
SODIUM BLD-SCNC: 141 MMOL/L (ref 136–145)
TOTAL PROTEIN: 7.8 G/DL (ref 6.4–8.2)
TRIGL SERPL-MCNC: 296 MG/DL (ref 0–150)
TSH SERPL DL<=0.05 MIU/L-ACNC: 0.82 UIU/ML (ref 0.27–4.2)
VLDLC SERPL CALC-MCNC: 59 MG/DL
WBC # BLD: 10.9 K/UL (ref 4–11)

## 2019-09-27 PROCEDURE — 83690 ASSAY OF LIPASE: CPT

## 2019-09-27 PROCEDURE — 80061 LIPID PANEL: CPT

## 2019-09-27 PROCEDURE — 85027 COMPLETE CBC AUTOMATED: CPT

## 2019-09-27 PROCEDURE — 80053 COMPREHEN METABOLIC PANEL: CPT

## 2019-09-27 PROCEDURE — 36415 COLL VENOUS BLD VENIPUNCTURE: CPT

## 2019-09-27 PROCEDURE — 84443 ASSAY THYROID STIM HORMONE: CPT

## 2019-11-21 ENCOUNTER — HOSPITAL ENCOUNTER (OUTPATIENT)
Dept: ULTRASOUND IMAGING | Age: 49
Discharge: HOME | End: 2019-11-21
Payer: COMMERCIAL

## 2019-11-21 DIAGNOSIS — R14.0 ABDOMINAL DISTENTION: ICD-10-CM

## 2019-11-21 PROCEDURE — 76705 ECHO EXAM OF ABDOMEN: CPT

## 2019-12-06 ENCOUNTER — HOSPITAL ENCOUNTER (OUTPATIENT)
Dept: NUCLEAR MEDICINE | Age: 49
Discharge: HOME | End: 2019-12-06
Payer: COMMERCIAL

## 2019-12-06 VITALS — WEIGHT: 205 LBS | HEIGHT: 60 IN | BODY MASS INDEX: 40.25 KG/M2

## 2019-12-06 DIAGNOSIS — R11.0 CHRONIC NAUSEA: ICD-10-CM

## 2019-12-06 DIAGNOSIS — K80.20 GALLSTONES: ICD-10-CM

## 2019-12-06 PROCEDURE — 2580000003 HC RX 258: Performed by: INTERNAL MEDICINE

## 2019-12-06 PROCEDURE — 3430000000 HC RX DIAGNOSTIC RADIOPHARMACEUTICAL: Performed by: INTERNAL MEDICINE

## 2019-12-06 PROCEDURE — A9537 TC99M MEBROFENIN: HCPCS | Performed by: INTERNAL MEDICINE

## 2019-12-06 PROCEDURE — 6360000002 HC RX W HCPCS: Performed by: INTERNAL MEDICINE

## 2019-12-06 PROCEDURE — 78227 HEPATOBIL SYST IMAGE W/DRUG: CPT

## 2019-12-06 RX ADMIN — SODIUM CHLORIDE 1.86 MCG: 9 INJECTION, SOLUTION INTRAVENOUS at 08:47

## 2019-12-06 RX ADMIN — Medication 5.5 MILLICURIE: at 07:50

## 2019-12-18 ENCOUNTER — PREP FOR PROCEDURE (OUTPATIENT)
Dept: SURGERY | Age: 49
End: 2019-12-18

## 2019-12-18 ENCOUNTER — INITIAL CONSULT (OUTPATIENT)
Dept: SURGERY | Age: 49
End: 2019-12-18
Payer: COMMERCIAL

## 2019-12-18 VITALS
SYSTOLIC BLOOD PRESSURE: 128 MMHG | DIASTOLIC BLOOD PRESSURE: 76 MMHG | BODY MASS INDEX: 40.52 KG/M2 | HEIGHT: 60 IN | WEIGHT: 206.4 LBS

## 2019-12-18 DIAGNOSIS — K80.10 CHRONIC CALCULOUS CHOLECYSTITIS: Primary | ICD-10-CM

## 2019-12-18 PROCEDURE — G8484 FLU IMMUNIZE NO ADMIN: HCPCS | Performed by: SURGERY

## 2019-12-18 PROCEDURE — G8427 DOCREV CUR MEDS BY ELIG CLIN: HCPCS | Performed by: SURGERY

## 2019-12-18 PROCEDURE — 99203 OFFICE O/P NEW LOW 30 MIN: CPT | Performed by: SURGERY

## 2019-12-18 PROCEDURE — G8417 CALC BMI ABV UP PARAM F/U: HCPCS | Performed by: SURGERY

## 2019-12-18 RX ORDER — HEPARIN SODIUM 5000 [USP'U]/ML
5000 INJECTION, SOLUTION INTRAVENOUS; SUBCUTANEOUS ONCE
Status: CANCELLED | OUTPATIENT
Start: 2019-12-18

## 2019-12-18 RX ORDER — SODIUM CHLORIDE 0.9 % (FLUSH) 0.9 %
10 SYRINGE (ML) INJECTION EVERY 12 HOURS SCHEDULED
Status: CANCELLED | OUTPATIENT
Start: 2019-12-18

## 2019-12-18 RX ORDER — SODIUM CHLORIDE 0.9 % (FLUSH) 0.9 %
10 SYRINGE (ML) INJECTION PRN
Status: CANCELLED | OUTPATIENT
Start: 2019-12-18

## 2020-01-07 ENCOUNTER — ANESTHESIA (OUTPATIENT)
Dept: OPERATING ROOM | Age: 50
End: 2020-01-07
Payer: COMMERCIAL

## 2020-01-07 ENCOUNTER — HOSPITAL ENCOUNTER (OUTPATIENT)
Age: 50
Setting detail: OUTPATIENT SURGERY
Discharge: HOME | End: 2020-01-07
Attending: SURGERY | Admitting: SURGERY
Payer: COMMERCIAL

## 2020-01-07 ENCOUNTER — ANESTHESIA EVENT (OUTPATIENT)
Dept: OPERATING ROOM | Age: 50
End: 2020-01-07
Payer: COMMERCIAL

## 2020-01-07 VITALS
WEIGHT: 200 LBS | SYSTOLIC BLOOD PRESSURE: 125 MMHG | RESPIRATION RATE: 10 BRPM | TEMPERATURE: 97.3 F | HEIGHT: 61 IN | BODY MASS INDEX: 37.76 KG/M2 | OXYGEN SATURATION: 95 % | HEART RATE: 84 BPM | DIASTOLIC BLOOD PRESSURE: 67 MMHG

## 2020-01-07 VITALS
SYSTOLIC BLOOD PRESSURE: 102 MMHG | DIASTOLIC BLOOD PRESSURE: 74 MMHG | RESPIRATION RATE: 22 BRPM | OXYGEN SATURATION: 97 %

## 2020-01-07 LAB
ALBUMIN SERPL-MCNC: 3.7 G/DL (ref 3.4–5)
ALP BLD-CCNC: 86 U/L (ref 40–129)
ALT SERPL-CCNC: 11 U/L (ref 10–40)
AST SERPL-CCNC: 11 U/L (ref 15–37)
BILIRUB SERPL-MCNC: 0.3 MG/DL (ref 0–1)
BILIRUBIN DIRECT: <0.2 MG/DL (ref 0–0.3)
BILIRUBIN, INDIRECT: ABNORMAL MG/DL (ref 0–1)
PREGNANCY, URINE: NEGATIVE
TOTAL PROTEIN: 6.7 G/DL (ref 6.4–8.2)

## 2020-01-07 PROCEDURE — 2500000003 HC RX 250 WO HCPCS: Performed by: NURSE ANESTHETIST, CERTIFIED REGISTERED

## 2020-01-07 PROCEDURE — 6360000002 HC RX W HCPCS: Performed by: NURSE ANESTHETIST, CERTIFIED REGISTERED

## 2020-01-07 PROCEDURE — 6360000002 HC RX W HCPCS: Performed by: SURGERY

## 2020-01-07 PROCEDURE — 7100000011 HC PHASE II RECOVERY - ADDTL 15 MIN: Performed by: SURGERY

## 2020-01-07 PROCEDURE — 3700000000 HC ANESTHESIA ATTENDED CARE: Performed by: SURGERY

## 2020-01-07 PROCEDURE — 36415 COLL VENOUS BLD VENIPUNCTURE: CPT

## 2020-01-07 PROCEDURE — 2500000003 HC RX 250 WO HCPCS: Performed by: SURGERY

## 2020-01-07 PROCEDURE — 2580000003 HC RX 258: Performed by: ANESTHESIOLOGY

## 2020-01-07 PROCEDURE — 3700000001 HC ADD 15 MINUTES (ANESTHESIA): Performed by: SURGERY

## 2020-01-07 PROCEDURE — 2580000003 HC RX 258: Performed by: SURGERY

## 2020-01-07 PROCEDURE — 84703 CHORIONIC GONADOTROPIN ASSAY: CPT

## 2020-01-07 PROCEDURE — 2709999900 HC NON-CHARGEABLE SUPPLY: Performed by: SURGERY

## 2020-01-07 PROCEDURE — 47562 LAPAROSCOPIC CHOLECYSTECTOMY: CPT | Performed by: SURGERY

## 2020-01-07 PROCEDURE — 3600000014 HC SURGERY LEVEL 4 ADDTL 15MIN: Performed by: SURGERY

## 2020-01-07 PROCEDURE — 3600000004 HC SURGERY LEVEL 4 BASE: Performed by: SURGERY

## 2020-01-07 PROCEDURE — 7100000001 HC PACU RECOVERY - ADDTL 15 MIN: Performed by: SURGERY

## 2020-01-07 PROCEDURE — 80076 HEPATIC FUNCTION PANEL: CPT

## 2020-01-07 PROCEDURE — 88304 TISSUE EXAM BY PATHOLOGIST: CPT

## 2020-01-07 PROCEDURE — 7100000000 HC PACU RECOVERY - FIRST 15 MIN: Performed by: SURGERY

## 2020-01-07 PROCEDURE — 7100000010 HC PHASE II RECOVERY - FIRST 15 MIN: Performed by: SURGERY

## 2020-01-07 PROCEDURE — 2720000010 HC SURG SUPPLY STERILE: Performed by: SURGERY

## 2020-01-07 RX ORDER — HYDRALAZINE HYDROCHLORIDE 20 MG/ML
5 INJECTION INTRAMUSCULAR; INTRAVENOUS EVERY 10 MIN PRN
Status: DISCONTINUED | OUTPATIENT
Start: 2020-01-07 | End: 2020-01-07 | Stop reason: HOSPADM

## 2020-01-07 RX ORDER — SODIUM CHLORIDE 0.9 % (FLUSH) 0.9 %
10 SYRINGE (ML) INJECTION PRN
Status: DISCONTINUED | OUTPATIENT
Start: 2020-01-07 | End: 2020-01-07 | Stop reason: HOSPADM

## 2020-01-07 RX ORDER — ONDANSETRON 2 MG/ML
INJECTION INTRAMUSCULAR; INTRAVENOUS PRN
Status: DISCONTINUED | OUTPATIENT
Start: 2020-01-07 | End: 2020-01-07 | Stop reason: SDUPTHER

## 2020-01-07 RX ORDER — ONDANSETRON 2 MG/ML
4 INJECTION INTRAMUSCULAR; INTRAVENOUS PRN
Status: DISCONTINUED | OUTPATIENT
Start: 2020-01-07 | End: 2020-01-07 | Stop reason: HOSPADM

## 2020-01-07 RX ORDER — ROCURONIUM BROMIDE 10 MG/ML
INJECTION, SOLUTION INTRAVENOUS PRN
Status: DISCONTINUED | OUTPATIENT
Start: 2020-01-07 | End: 2020-01-07 | Stop reason: SDUPTHER

## 2020-01-07 RX ORDER — BUPIVACAINE HYDROCHLORIDE 5 MG/ML
INJECTION, SOLUTION EPIDURAL; INTRACAUDAL PRN
Status: DISCONTINUED | OUTPATIENT
Start: 2020-01-07 | End: 2020-01-07 | Stop reason: ALTCHOICE

## 2020-01-07 RX ORDER — DIPHENHYDRAMINE HYDROCHLORIDE 50 MG/ML
12.5 INJECTION INTRAMUSCULAR; INTRAVENOUS
Status: DISCONTINUED | OUTPATIENT
Start: 2020-01-07 | End: 2020-01-07 | Stop reason: HOSPADM

## 2020-01-07 RX ORDER — LABETALOL HYDROCHLORIDE 5 MG/ML
INJECTION, SOLUTION INTRAVENOUS PRN
Status: DISCONTINUED | OUTPATIENT
Start: 2020-01-07 | End: 2020-01-07 | Stop reason: SDUPTHER

## 2020-01-07 RX ORDER — OXYCODONE HYDROCHLORIDE 5 MG/1
5 TABLET ORAL EVERY 6 HOURS PRN
Qty: 28 TABLET | Refills: 0 | Status: SHIPPED | OUTPATIENT
Start: 2020-01-07 | End: 2020-01-15

## 2020-01-07 RX ORDER — PROPOFOL 10 MG/ML
INJECTION, EMULSION INTRAVENOUS PRN
Status: DISCONTINUED | OUTPATIENT
Start: 2020-01-07 | End: 2020-01-07 | Stop reason: SDUPTHER

## 2020-01-07 RX ORDER — SODIUM CHLORIDE, SODIUM LACTATE, POTASSIUM CHLORIDE, CALCIUM CHLORIDE 600; 310; 30; 20 MG/100ML; MG/100ML; MG/100ML; MG/100ML
INJECTION, SOLUTION INTRAVENOUS CONTINUOUS
Status: DISCONTINUED | OUTPATIENT
Start: 2020-01-07 | End: 2020-01-07 | Stop reason: HOSPADM

## 2020-01-07 RX ORDER — OXYCODONE HYDROCHLORIDE AND ACETAMINOPHEN 5; 325 MG/1; MG/1
2 TABLET ORAL PRN
Status: DISCONTINUED | OUTPATIENT
Start: 2020-01-07 | End: 2020-01-07 | Stop reason: HOSPADM

## 2020-01-07 RX ORDER — GLYCOPYRROLATE 0.2 MG/ML
INJECTION INTRAMUSCULAR; INTRAVENOUS PRN
Status: DISCONTINUED | OUTPATIENT
Start: 2020-01-07 | End: 2020-01-07 | Stop reason: SDUPTHER

## 2020-01-07 RX ORDER — LABETALOL HYDROCHLORIDE 5 MG/ML
5 INJECTION, SOLUTION INTRAVENOUS EVERY 10 MIN PRN
Status: DISCONTINUED | OUTPATIENT
Start: 2020-01-07 | End: 2020-01-07 | Stop reason: HOSPADM

## 2020-01-07 RX ORDER — PROMETHAZINE HYDROCHLORIDE 25 MG/ML
6.25 INJECTION, SOLUTION INTRAMUSCULAR; INTRAVENOUS
Status: DISCONTINUED | OUTPATIENT
Start: 2020-01-07 | End: 2020-01-07 | Stop reason: HOSPADM

## 2020-01-07 RX ORDER — LIDOCAINE HYDROCHLORIDE 10 MG/ML
2 INJECTION, SOLUTION INFILTRATION; PERINEURAL
Status: DISCONTINUED | OUTPATIENT
Start: 2020-01-07 | End: 2020-01-07 | Stop reason: HOSPADM

## 2020-01-07 RX ORDER — FENTANYL CITRATE 50 UG/ML
INJECTION, SOLUTION INTRAMUSCULAR; INTRAVENOUS PRN
Status: DISCONTINUED | OUTPATIENT
Start: 2020-01-07 | End: 2020-01-07 | Stop reason: SDUPTHER

## 2020-01-07 RX ORDER — SODIUM CHLORIDE, SODIUM LACTATE, POTASSIUM CHLORIDE, AND CALCIUM CHLORIDE .6; .31; .03; .02 G/100ML; G/100ML; G/100ML; G/100ML
IRRIGANT IRRIGATION PRN
Status: DISCONTINUED | OUTPATIENT
Start: 2020-01-07 | End: 2020-01-07 | Stop reason: ALTCHOICE

## 2020-01-07 RX ORDER — MORPHINE SULFATE 10 MG/ML
1 INJECTION, SOLUTION INTRAMUSCULAR; INTRAVENOUS EVERY 5 MIN PRN
Status: DISCONTINUED | OUTPATIENT
Start: 2020-01-07 | End: 2020-01-07 | Stop reason: HOSPADM

## 2020-01-07 RX ORDER — HEPARIN SODIUM 5000 [USP'U]/ML
5000 INJECTION, SOLUTION INTRAVENOUS; SUBCUTANEOUS ONCE
Status: COMPLETED | OUTPATIENT
Start: 2020-01-07 | End: 2020-01-07

## 2020-01-07 RX ORDER — OXYCODONE HYDROCHLORIDE AND ACETAMINOPHEN 5; 325 MG/1; MG/1
1 TABLET ORAL PRN
Status: DISCONTINUED | OUTPATIENT
Start: 2020-01-07 | End: 2020-01-07 | Stop reason: HOSPADM

## 2020-01-07 RX ORDER — SODIUM CHLORIDE 0.9 % (FLUSH) 0.9 %
10 SYRINGE (ML) INJECTION EVERY 12 HOURS SCHEDULED
Status: DISCONTINUED | OUTPATIENT
Start: 2020-01-07 | End: 2020-01-07 | Stop reason: HOSPADM

## 2020-01-07 RX ORDER — MORPHINE SULFATE 10 MG/ML
2 INJECTION, SOLUTION INTRAMUSCULAR; INTRAVENOUS EVERY 5 MIN PRN
Status: DISCONTINUED | OUTPATIENT
Start: 2020-01-07 | End: 2020-01-07 | Stop reason: HOSPADM

## 2020-01-07 RX ADMIN — LABETALOL HYDROCHLORIDE 10 MG: 5 INJECTION, SOLUTION INTRAVENOUS at 07:50

## 2020-01-07 RX ADMIN — SODIUM CHLORIDE, POTASSIUM CHLORIDE, SODIUM LACTATE AND CALCIUM CHLORIDE: 600; 310; 30; 20 INJECTION, SOLUTION INTRAVENOUS at 06:45

## 2020-01-07 RX ADMIN — ROCURONIUM BROMIDE 50 MG: 10 INJECTION, SOLUTION INTRAVENOUS at 07:36

## 2020-01-07 RX ADMIN — FENTANYL CITRATE 200 MCG: 50 INJECTION, SOLUTION INTRAMUSCULAR; INTRAVENOUS at 07:42

## 2020-01-07 RX ADMIN — GLYCOPYRROLATE 0.2 MG: 0.2 INJECTION, SOLUTION INTRAMUSCULAR; INTRAVENOUS at 07:35

## 2020-01-07 RX ADMIN — FENTANYL CITRATE 300 MCG: 50 INJECTION, SOLUTION INTRAMUSCULAR; INTRAVENOUS at 07:35

## 2020-01-07 RX ADMIN — Medication 2 G: at 07:27

## 2020-01-07 RX ADMIN — HEPARIN SODIUM 5000 UNITS: 5000 INJECTION INTRAVENOUS; SUBCUTANEOUS at 07:02

## 2020-01-07 RX ADMIN — FENTANYL CITRATE 200 MCG: 50 INJECTION, SOLUTION INTRAMUSCULAR; INTRAVENOUS at 07:52

## 2020-01-07 RX ADMIN — PROPOFOL 200 MG: 10 INJECTION, EMULSION INTRAVENOUS at 07:36

## 2020-01-07 RX ADMIN — SUGAMMADEX 200 MG: 100 INJECTION, SOLUTION INTRAVENOUS at 08:11

## 2020-01-07 RX ADMIN — ONDANSETRON 4 MG: 2 INJECTION, SOLUTION INTRAMUSCULAR; INTRAVENOUS at 08:07

## 2020-01-07 RX ADMIN — LABETALOL HYDROCHLORIDE 10 MG: 5 INJECTION, SOLUTION INTRAVENOUS at 07:53

## 2020-01-07 ASSESSMENT — PULMONARY FUNCTION TESTS
PIF_VALUE: 23
PIF_VALUE: 30
PIF_VALUE: 13
PIF_VALUE: 1
PIF_VALUE: 34
PIF_VALUE: 25
PIF_VALUE: 25
PIF_VALUE: 7
PIF_VALUE: 35
PIF_VALUE: 27
PIF_VALUE: 30
PIF_VALUE: 32
PIF_VALUE: 34
PIF_VALUE: 33
PIF_VALUE: 27
PIF_VALUE: 8
PIF_VALUE: 34
PIF_VALUE: 25
PIF_VALUE: 34
PIF_VALUE: 5
PIF_VALUE: 1
PIF_VALUE: 34
PIF_VALUE: 34
PIF_VALUE: 35
PIF_VALUE: 1
PIF_VALUE: 27
PIF_VALUE: 27
PIF_VALUE: 4
PIF_VALUE: 34
PIF_VALUE: 24
PIF_VALUE: 33
PIF_VALUE: 32
PIF_VALUE: 34
PIF_VALUE: 1
PIF_VALUE: 33
PIF_VALUE: 34
PIF_VALUE: 24
PIF_VALUE: 1
PIF_VALUE: 28
PIF_VALUE: 26
PIF_VALUE: 25
PIF_VALUE: 26
PIF_VALUE: 39
PIF_VALUE: 32
PIF_VALUE: 28
PIF_VALUE: 28
PIF_VALUE: 0
PIF_VALUE: 28
PIF_VALUE: 12
PIF_VALUE: 32
PIF_VALUE: 33
PIF_VALUE: 25
PIF_VALUE: 25

## 2020-01-07 ASSESSMENT — PAIN - FUNCTIONAL ASSESSMENT: PAIN_FUNCTIONAL_ASSESSMENT: 0-10

## 2020-01-07 NOTE — ANESTHESIA POSTPROCEDURE EVALUATION
Department of Anesthesiology  Postprocedure Note    Patient: Silverio Zimmerman  MRN: [id number]  [place]: 1970  Date of evaluation: 1/7/2020  Time:  11:57 AM     Procedure Summary     Date:  01/07/20 Room / Location:  [address] [place] [address]    Anesthesia Start:  4450 Anesthesia Stop:  5922    Procedure:  LAPAROSCOPIC CHOLECYSTECTOMY (N/A Abdomen) Diagnosis:  (CHRONIC CALCULOUS CHOLECYSTITIS)    Surgeon:  Lora Cadet MD Responsible Provider:  Oksana Rajput MD    Anesthesia Type:  general ASA Status:  2          Anesthesia Type: general    Kenna Phase I: Kenna Score: 9    Kenna Phase II: Kenna Score: 10    Last vitals: Reviewed and per EMR flowsheets.        Anesthesia Post Evaluation    Patient location during evaluation: PACU  Patient participation: complete - patient participated  Level of consciousness: awake and alert  Pain score: 0  Airway patency: patent  Nausea & Vomiting: no nausea and no vomiting  Complications: no  Cardiovascular status: blood pressure returned to baseline  Respiratory status: acceptable  Hydration status: stable

## 2020-01-07 NOTE — LETTER
[facility] OR  [address] [place] [address]  Phone: [phone]             January 7, 2020    Patient: Kate Bowen   YOB: 1970   Date of Visit: 1/7/2020       To Whom It May Concern:    Karl Lopez was seen and treated in our facility 1/7/2020.     Sincerely,       Rosiland Osgood, RN         Signature:__________________________________

## 2020-01-07 NOTE — OP NOTE
[address]                 [address] [address]                                OPERATIVE REPORT    PATIENT NAME: Alfredo Rios                  :        1970  MED REC NO:   [id number]                          ROOM:  ACCOUNT NO:   [de-identified]                           ADMIT DATE: 2020  PROVIDER:     Romelia Mora MD    DATE OF PROCEDURE:  2020    PREOPERATIVE DIAGNOSIS:  Chronic calculous cholecystitis. POSTOPERATIVE DIAGNOSIS:  Chronic calculous cholecystitis. OPERATION PERFORMED:  Laparoscopic cholecystectomy. SURGEON:  Romelia Mora MD    ANESTHESIA:  General.    COMPLICATIONS:  None. ESTIMATED BLOOD LOSS:  Less than 50 mL. INDICATIONS FOR OPERATION:  A 52-year-old female with recurring episodes  of epigastric and right upper quadrant pain. Imaging showed gallstones. She also had a decreased gallbladder ejection fraction on HIDA scan. The risks and benefits of operative intervention were explained. The  patient understood them, accepted them, and elected to proceed. DESCRIPTION OF OPERATION:  The patient was brought to the operating  room. General anesthesia was induced. She was prepped and draped in  usual surgical sterile fashion. A vertical supraumbilical incision was  made with a knife. Subcutaneous tissues were spread. Penetrating towel  clip was used to elevate the anterior abdominal wall. The Veress needle  was inserted. Pneumoperitoneum was established. A disposable 5 mm  trocar was passed through the incision. The laparoscope was inserted. Under direct vision, an 11-mm port was placed in the epigastrium and two  5 mm ports in the right upper quadrant. We had adequate visualization  and retraction. I identified the cystic duct, as it tapered into the  gallbladder. Three clips were placed away from the gallbladder, one  clip next to the gallbladder, and the cystic duct was divided. Cystic  artery had two clips placed proximal, one clip distal, and it was  divided. Posterior branch of the artery was clipped as well. Gallbladder was dissected from the gallbladder fossa of the liver bed. Gallbladder was brought out through the epigastric incision. I  reinspected the right upper quadrant. I copiously irrigated the area. I suctioned out the irrigant. There was no evident bleeding, bile leak,  or complication. I deflated the abdomen and removed the trocars. The  fascia at the epigastric port site was re-approximated with 0 Vicryl  suture. Local anesthetic was infiltrated. 4-0 Vicryl was used to  reapproximate the skin at all the incisions. Benzoin and Steri-Strip  dressing were placed. DISPOSITION:  The patient tolerated the procedure without any acute  complication.         Ariella Davis MD    D: 01/07/2020 8:22:01       T: 01/07/2020 8:27:34     [email or web address]  Job#: [id number]     Doc#: [id number]    CC:

## 2020-01-07 NOTE — ANESTHESIA PRE PROCEDURE
Diagnosis Date    Degeneration of lumbar or lumbosacral intervertebral disc 11/26/2018    Lateral epicondylitis of left elbow 10/9/2018    Left carpal tunnel syndrome 11/19/2018    Menorrhagia 9/2011       Past Surgical History:        Procedure Laterality Date    EPIDURAL STEROID INJECTION Bilateral 1/10/2019    BILATERAL LUMBAR THREE, LUMBAR FOUR, LUMBAR FIVE RADIOFREQUENCY ABLATION SITE CONFIRMED BY FLUOROSCOPY performed by Breonna Cary MD at [address] HYSTEROSCOPY  09/27/2011    Dilation and Curetatge novasure ablation    TONSILLECTOMY      WISDOM TOOTH EXTRACTION  1987       Social History:    Social History     Tobacco Use    Smoking status: Current Every Day Smoker     Packs/day: 1.00     Years: 20.00     Pack years: 20.00    Smokeless tobacco: Never Used   Substance Use Topics    Alcohol use: No                                Ready to quit: Not Answered  Counseling given: Not Answered      Vital Signs (Current):   Vitals:    01/07/20 0628 01/07/20 0637   BP:  (!) 154/101   Pulse: 97    Resp: 20    Temp: 97.1 °F (36.2 °C)    TempSrc: Temporal    SpO2: 96%    Weight: 200 lb (90.7 kg)    Height: 5' 1\" (1.549 m)                                               BP Readings from Last 3 Encounters:   01/07/20 (!) 154/101   12/18/19 128/76   03/04/19 (!) 141/96       NPO Status: Time of last liquid consumption: 2000                        Time of last solid consumption: 2000                        Date of last liquid consumption: 01/06/20                        Date of last solid food consumption: 01/06/20    BMI:   Wt Readings from Last 3 Encounters:   01/07/20 200 lb (90.7 kg)   12/18/19 206 lb 6.4 oz (93.6 kg)   12/06/19 205 lb (93 kg)     Body mass index is 37.79 kg/m².     CBC:   Lab Results   Component Value Date    WBC 10.9 09/27/2019    RBC 5.54 09/27/2019    HGB 15.7 09/27/2019    HCT 46.4 09/27/2019    MCV 83.7 09/27/2019    RDW 14.0 09/27/2019     09/27/2019       CMP:   Lab Results   Component Value Date     09/27/2019    K 4.2 09/27/2019     09/27/2019    CO2 25 09/27/2019    BUN 11 09/27/2019    CREATININE <0.5 09/27/2019    GFRAA >60 09/27/2019    AGRATIO 1.5 09/27/2019    LABGLOM >60 09/27/2019    GLUCOSE 127 09/27/2019    PROT 7.8 09/27/2019    CALCIUM 9.8 09/27/2019    BILITOT 0.4 09/27/2019    ALKPHOS 96 09/27/2019    AST 15 09/27/2019    ALT 9 09/27/2019       POC Tests: No results for input(s): POCGLU, POCNA, POCK, POCCL, POCBUN, POCHEMO, POCHCT in the last 72 hours. Coags: No results found for: PROTIME, INR, APTT    HCG (If Applicable):   Lab Results   Component Value Date    PREGTESTUR Negative 01/07/2020        ABGs: No results found for: PHART, PO2ART, BIX0TBE, WYD4FXW, BEART, I4IWMLQB     Type & Screen (If Applicable):  No results found for: LABABO, [address]    Anesthesia Evaluation  Patient summary reviewed and Nursing notes reviewed  Airway: Mallampati: IV  TM distance: <3 FB   Neck ROM: full  Mouth opening: > = 3 FB Dental: normal exam         Pulmonary:Negative Pulmonary ROS and normal exam  breath sounds clear to auscultation                             Cardiovascular:Negative CV ROS            Rhythm: regular  Rate: normal                    Neuro/Psych:   Negative Neuro/Psych ROS              GI/Hepatic/Renal: Neg GI/Hepatic/Renal ROS            Endo/Other:    (+) : arthritis: OA., .                 Abdominal:   (+) obese,         Vascular: negative vascular ROS. Anesthesia Plan      general     ASA 2       Induction: intravenous. MIPS: Postoperative opioids intended and Prophylactic antiemetics administered. Anesthetic plan and risks discussed with patient. Plan discussed with CRNA.                   Yonny Steele MD   1/7/2020

## 2020-01-07 NOTE — H&P
[facility] GENERAL SURGERY      The H&P was reviewed, the patient was examined, and no change has occurred in the patient's condition since the H&P was completed. The indications for the procedure were reviewed, and any questions were answered. I updated the progress note from 12/18/2019 which is the H&P.     Vitals:    01/07/20 0628 01/07/20 0637   BP:  (!) 154/101   Pulse: 97    Resp: 20    Temp: 97.1 °F (36.2 °C)    TempSrc: Temporal    SpO2: 96%    Weight: 200 lb (90.7 kg)    Height: 5' 1\" (1.549 m)

## 2020-01-15 ENCOUNTER — HOSPITAL ENCOUNTER (EMERGENCY)
Age: 50
Discharge: HOME | End: 2020-01-15
Attending: EMERGENCY MEDICINE
Payer: COMMERCIAL

## 2020-01-15 VITALS
WEIGHT: 200 LBS | DIASTOLIC BLOOD PRESSURE: 84 MMHG | SYSTOLIC BLOOD PRESSURE: 144 MMHG | TEMPERATURE: 97 F | BODY MASS INDEX: 37.76 KG/M2 | HEART RATE: 98 BPM | HEIGHT: 61 IN | OXYGEN SATURATION: 94 % | RESPIRATION RATE: 16 BRPM

## 2020-01-15 PROCEDURE — 99283 EMERGENCY DEPT VISIT LOW MDM: CPT

## 2020-01-15 PROCEDURE — 6370000000 HC RX 637 (ALT 250 FOR IP): Performed by: EMERGENCY MEDICINE

## 2020-01-15 PROCEDURE — 96372 THER/PROPH/DIAG INJ SC/IM: CPT

## 2020-01-15 PROCEDURE — 6360000002 HC RX W HCPCS: Performed by: EMERGENCY MEDICINE

## 2020-01-15 RX ORDER — DEXAMETHASONE SODIUM PHOSPHATE 10 MG/ML
10 INJECTION INTRAMUSCULAR; INTRAVENOUS ONCE
Status: COMPLETED | OUTPATIENT
Start: 2020-01-15 | End: 2020-01-15

## 2020-01-15 RX ORDER — KETOROLAC TROMETHAMINE 30 MG/ML
15 INJECTION, SOLUTION INTRAMUSCULAR; INTRAVENOUS ONCE
Status: COMPLETED | OUTPATIENT
Start: 2020-01-15 | End: 2020-01-15

## 2020-01-15 RX ORDER — GABAPENTIN 300 MG/1
300 CAPSULE ORAL ONCE
Status: COMPLETED | OUTPATIENT
Start: 2020-01-15 | End: 2020-01-15

## 2020-01-15 RX ORDER — GABAPENTIN 100 MG/1
300 CAPSULE ORAL 3 TIMES DAILY PRN
Qty: 15 CAPSULE | Refills: 0 | Status: SHIPPED | OUTPATIENT
Start: 2020-01-15 | End: 2020-02-23

## 2020-01-15 RX ADMIN — KETOROLAC TROMETHAMINE 15 MG: 30 INJECTION, SOLUTION INTRAMUSCULAR; INTRAVENOUS at 04:32

## 2020-01-15 RX ADMIN — GABAPENTIN 300 MG: 300 CAPSULE ORAL at 04:32

## 2020-01-15 RX ADMIN — DEXAMETHASONE SODIUM PHOSPHATE 10 MG: 10 INJECTION, SOLUTION INTRAMUSCULAR; INTRAVENOUS at 04:33

## 2020-01-15 ASSESSMENT — PAIN DESCRIPTION - PAIN TYPE
TYPE: ACUTE PAIN
TYPE: ACUTE PAIN

## 2020-01-15 ASSESSMENT — PAIN DESCRIPTION - LOCATION
LOCATION: BACK
LOCATION: BACK

## 2020-01-15 ASSESSMENT — PAIN DESCRIPTION - DESCRIPTORS: DESCRIPTORS: ACHING;THROBBING;SHARP;SHOOTING

## 2020-01-15 ASSESSMENT — PAIN DESCRIPTION - FREQUENCY: FREQUENCY: CONTINUOUS

## 2020-01-15 ASSESSMENT — PAIN SCALES - GENERAL
PAINLEVEL_OUTOF10: 10
PAINLEVEL_OUTOF10: 7
PAINLEVEL_OUTOF10: 10

## 2020-01-15 ASSESSMENT — PAIN DESCRIPTION - ORIENTATION: ORIENTATION: RIGHT;LOWER

## 2020-01-15 NOTE — ED PROVIDER NOTES
CHIEF COMPLAINT  Back pain    HISTORY OF PRESENT ILLNESS  Yo Florentino is a 51 y.o. female presents to the ED with low back pain, shooting down R leg, hx of sciatica, hx of lumbar DDD, missed work because of pain, denies vaginal bleeding/discharge, denies dysuria/hematuria, no fevers, no chest pain/SOB, no headache/neck pain. No bowel/bladder incontinence, no urinary retention, no saddle anesthesia, smoker, denies IVDA, no new injury/trauma, No other complaints, modifying factors or associated symptoms. I have reviewed the following from the nursing documentation.     Past Medical History:   Diagnosis Date    Degeneration of lumbar or lumbosacral intervertebral disc 11/26/2018    Lateral epicondylitis of left elbow 10/9/2018    Left carpal tunnel syndrome 11/19/2018    Menorrhagia 9/2011     Past Surgical History:   Procedure Laterality Date    CHOLECYSTECTOMY  01/07/2020    LAPAROSCOPIC CHOLECYSTECTOMY     CHOLECYSTECTOMY, LAPAROSCOPIC N/A 1/7/2020    LAPAROSCOPIC CHOLECYSTECTOMY performed by Beltran Boothe MD at [address] Bilateral 1/10/2019    BILATERAL LUMBAR THREE, LUMBAR FOUR, LUMBAR FIVE RADIOFREQUENCY ABLATION SITE CONFIRMED BY FLUOROSCOPY performed by Scooter Topete MD at [address] HYSTEROSCOPY  09/27/2011    Dilation and Curetatge novasure ablation    TONSILLECTOMY      WISDOM TOOTH EXTRACTION  1987     Family History   Problem Relation Age of Onset    Cancer Mother         Breast    No Known Problems Father     No Known Problems Sister     No Known Problems Brother     No Known Problems Maternal Aunt     No Known Problems Maternal Uncle     No Known Problems Paternal Aunt     No Known Problems Paternal Uncle     No Known Problems Maternal Grandmother     No Known Problems Maternal Grandfather     No Known Problems Paternal Grandmother     No Known Problems Paternal Grandfather     No Known Problems Other     Anesth Problems Neg Hx     Broken Bones Neg Hx     Clotting Disorder Neg Hx     Collagen Disease Neg Hx     Diabetes Neg Hx     Dislocations Neg Hx     Osteoporosis Neg Hx     Rheumatologic Disease Neg Hx     Scoliosis Neg Hx     Severe Sprains Neg Hx      Social History     Socioeconomic History    Marital status:      Spouse name: Not on file    Number of children: Not on file    Years of education: Not on file    Highest education level: Not on file   Occupational History    Not on file   Social Needs    Financial resource strain: Not on file    Food insecurity:     Worry: Not on file     Inability: Not on file    Transportation needs:     Medical: Not on file     Non-medical: Not on file   Tobacco Use    Smoking status: Current Every Day Smoker     Packs/day: 1.00     Years: 20.00     Pack years: 20.00    Smokeless tobacco: Never Used   Substance and Sexual Activity    Alcohol use: No    Drug use: No    Sexual activity: Yes     Partners: Male   Lifestyle    Physical activity:     Days per week: Not on file     Minutes per session: Not on file    Stress: Not on file   Relationships    Social connections:     Talks on phone: Not on file     Gets together: Not on file     Attends Yarsani service: Not on file     Active member of club or organization: Not on file     Attends meetings of clubs or organizations: Not on file     Relationship status: Not on file    Intimate partner violence:     Fear of current or ex partner: Not on file     Emotionally abused: Not on file     Physically abused: Not on file     Forced sexual activity: Not on file   Other Topics Concern    Not on file   Social History Narrative    Not on file     No current facility-administered medications for this encounter. Current Outpatient Medications   Medication Sig Dispense Refill    gabapentin (NEURONTIN) 100 MG capsule Take 3 capsules by mouth 3 times daily as needed (nerve pain) for up to 5 days.  15 capsule 0   

## 2020-01-15 NOTE — DISCHARGE INSTR - COC
INTERVENTION:[id number]}  Weight Bearing Status/Restrictions: [employer] [employer] Weight Bearin}  Other Medical Equipment (for information only, NOT a DME order):  {EQUIPMENT:910131542}  Other Treatments: ***    Patient's personal belongings (please select all that are sent with patient):  {CHP DME Belongings:658753953}    RN SIGNATURE:  {Esignature:[id number]}    CASE MANAGEMENT/SOCIAL WORK SECTION    Inpatient Status Date: ***    Readmission Risk Assessment Score:  Readmission Risk              Risk of Unplanned Readmission:        0           Discharging to Facility/ Agency   · Name:   · Address:  · Phone:  · Fax:    Dialysis Facility (if applicable)   · Name:  · Address:  · Dialysis Schedule:  · Phone:  · Fax:    / signature: {Esignature:754719539}    PHYSICIAN SECTION    Prognosis: {Prognosis:8021332239}    Condition at Discharge: 50[address] Mcclendon Patient Condition:916008658}    Rehab Potential (if transferring to Rehab): {Prognosis:5781197949}    Recommended Labs or Other Treatments After Discharge: ***    Physician Certification: I certify the above information and transfer of Michelle Ramirez  is necessary for the continuing treatment of the diagnosis listed and that she requires {Admit to Appropriate Level of Care:77829} for {GREATER/LESS:708649603} 30 days.      Update Admission H&P: {CHP DME Changes in [place]:481202163}    PHYSICIAN SIGNATURE:  {Esignature:660760942} no

## 2020-01-22 ENCOUNTER — OFFICE VISIT (OUTPATIENT)
Dept: SURGERY | Age: 50
End: 2020-01-22

## 2020-01-22 VITALS
BODY MASS INDEX: 38.14 KG/M2 | SYSTOLIC BLOOD PRESSURE: 124 MMHG | DIASTOLIC BLOOD PRESSURE: 70 MMHG | HEIGHT: 61 IN | WEIGHT: 202 LBS

## 2020-01-22 PROCEDURE — 99024 POSTOP FOLLOW-UP VISIT: CPT | Performed by: SURGERY

## 2020-02-19 ENCOUNTER — HOSPITAL ENCOUNTER (OUTPATIENT)
Dept: CT IMAGING | Age: 50
Discharge: HOME | End: 2020-02-19
Payer: COMMERCIAL

## 2020-02-19 PROCEDURE — 74176 CT ABD & PELVIS W/O CONTRAST: CPT

## 2020-02-21 ENCOUNTER — HOSPITAL ENCOUNTER (OUTPATIENT)
Dept: GENERAL RADIOLOGY | Age: 50
Discharge: HOME | End: 2020-02-21
Payer: COMMERCIAL

## 2020-02-21 ENCOUNTER — HOSPITAL ENCOUNTER (OUTPATIENT)
Age: 50
Discharge: HOME | End: 2020-02-21
Payer: COMMERCIAL

## 2020-02-21 PROCEDURE — 74018 RADEX ABDOMEN 1 VIEW: CPT

## 2020-02-23 ENCOUNTER — HOSPITAL ENCOUNTER (OUTPATIENT)
Age: 50
Setting detail: OBSERVATION
Discharge: HOME | End: 2020-02-24
Attending: EMERGENCY MEDICINE | Admitting: INTERNAL MEDICINE
Payer: COMMERCIAL

## 2020-02-23 PROBLEM — N20.0 KIDNEY STONE: Status: ACTIVE | Noted: 2020-02-23

## 2020-02-23 PROBLEM — R11.2 NAUSEA AND VOMITING: Status: ACTIVE | Noted: 2020-02-23

## 2020-02-23 LAB
A/G RATIO: 1.4 (ref 1.1–2.2)
ALBUMIN SERPL-MCNC: 4.1 G/DL (ref 3.4–5)
ALP BLD-CCNC: 84 U/L (ref 40–129)
ALT SERPL-CCNC: 8 U/L (ref 10–40)
ANION GAP SERPL CALCULATED.3IONS-SCNC: 12 MMOL/L (ref 3–16)
AST SERPL-CCNC: 10 U/L (ref 15–37)
BACTERIA: ABNORMAL /HPF
BASOPHILS ABSOLUTE: 0.1 K/UL (ref 0–0.2)
BASOPHILS RELATIVE PERCENT: 0.7 %
BILIRUB SERPL-MCNC: <0.2 MG/DL (ref 0–1)
BILIRUBIN URINE: NEGATIVE
BLOOD, URINE: ABNORMAL
BUN BLDV-MCNC: 11 MG/DL (ref 7–20)
CALCIUM SERPL-MCNC: 9.2 MG/DL (ref 8.3–10.6)
CHLORIDE BLD-SCNC: 99 MMOL/L (ref 99–110)
CLARITY: CLEAR
CO2: 23 MMOL/L (ref 21–32)
COLOR: YELLOW
CREAT SERPL-MCNC: 0.9 MG/DL (ref 0.6–1.1)
EOSINOPHILS ABSOLUTE: 0.1 K/UL (ref 0–0.6)
EOSINOPHILS RELATIVE PERCENT: 1 %
EPITHELIAL CELLS, UA: ABNORMAL /HPF (ref 0–5)
GFR AFRICAN AMERICAN: >60
GFR NON-AFRICAN AMERICAN: >60
GLOBULIN: 3 G/DL
GLUCOSE BLD-MCNC: 175 MG/DL (ref 70–99)
GLUCOSE URINE: NEGATIVE MG/DL
HCG(URINE) PREGNANCY TEST: NEGATIVE
HCT VFR BLD CALC: 45.2 % (ref 36–48)
HEMOGLOBIN: 14.9 G/DL (ref 12–16)
KETONES, URINE: NEGATIVE MG/DL
LEUKOCYTE ESTERASE, URINE: NEGATIVE
LIPASE: 20 U/L (ref 13–60)
LYMPHOCYTES ABSOLUTE: 2.1 K/UL (ref 1–5.1)
LYMPHOCYTES RELATIVE PERCENT: 16.9 %
MCH RBC QN AUTO: 27.6 PG (ref 26–34)
MCHC RBC AUTO-ENTMCNC: 32.9 G/DL (ref 31–36)
MCV RBC AUTO: 84 FL (ref 80–100)
MICROSCOPIC EXAMINATION: YES
MONOCYTES ABSOLUTE: 0.6 K/UL (ref 0–1.3)
MONOCYTES RELATIVE PERCENT: 4.7 %
NEUTROPHILS ABSOLUTE: 9.5 K/UL (ref 1.7–7.7)
NEUTROPHILS RELATIVE PERCENT: 76.7 %
NITRITE, URINE: NEGATIVE
PDW BLD-RTO: 13.5 % (ref 12.4–15.4)
PH UA: 6 (ref 5–8)
PLATELET # BLD: 358 K/UL (ref 135–450)
PMV BLD AUTO: 7.1 FL (ref 5–10.5)
POTASSIUM REFLEX MAGNESIUM: 4 MMOL/L (ref 3.5–5.1)
PROTEIN UA: NEGATIVE MG/DL
RBC # BLD: 5.38 M/UL (ref 4–5.2)
RBC UA: ABNORMAL /HPF (ref 0–4)
SODIUM BLD-SCNC: 134 MMOL/L (ref 136–145)
SPECIFIC GRAVITY UA: 1.01 (ref 1–1.03)
TOTAL PROTEIN: 7.1 G/DL (ref 6.4–8.2)
URINE REFLEX TO CULTURE: ABNORMAL
URINE TYPE: ABNORMAL
UROBILINOGEN, URINE: 0.2 E.U./DL
WBC # BLD: 12.4 K/UL (ref 4–11)
WBC UA: ABNORMAL /HPF (ref 0–5)

## 2020-02-23 PROCEDURE — 80053 COMPREHEN METABOLIC PANEL: CPT

## 2020-02-23 PROCEDURE — 6370000000 HC RX 637 (ALT 250 FOR IP): Performed by: NURSE PRACTITIONER

## 2020-02-23 PROCEDURE — 83690 ASSAY OF LIPASE: CPT

## 2020-02-23 PROCEDURE — G0378 HOSPITAL OBSERVATION PER HR: HCPCS

## 2020-02-23 PROCEDURE — 2580000003 HC RX 258: Performed by: PHYSICIAN ASSISTANT

## 2020-02-23 PROCEDURE — 85025 COMPLETE CBC W/AUTO DIFF WBC: CPT

## 2020-02-23 PROCEDURE — 81001 URINALYSIS AUTO W/SCOPE: CPT

## 2020-02-23 PROCEDURE — 84703 CHORIONIC GONADOTROPIN ASSAY: CPT

## 2020-02-23 PROCEDURE — 96375 TX/PRO/DX INJ NEW DRUG ADDON: CPT

## 2020-02-23 PROCEDURE — 2580000003 HC RX 258: Performed by: NURSE PRACTITIONER

## 2020-02-23 PROCEDURE — 96376 TX/PRO/DX INJ SAME DRUG ADON: CPT

## 2020-02-23 PROCEDURE — 99284 EMERGENCY DEPT VISIT MOD MDM: CPT

## 2020-02-23 PROCEDURE — 96372 THER/PROPH/DIAG INJ SC/IM: CPT

## 2020-02-23 PROCEDURE — 6360000002 HC RX W HCPCS: Performed by: PHYSICIAN ASSISTANT

## 2020-02-23 PROCEDURE — 96374 THER/PROPH/DIAG INJ IV PUSH: CPT

## 2020-02-23 PROCEDURE — 6360000002 HC RX W HCPCS: Performed by: NURSE PRACTITIONER

## 2020-02-23 RX ORDER — ONDANSETRON 2 MG/ML
4 INJECTION INTRAMUSCULAR; INTRAVENOUS EVERY 6 HOURS PRN
Status: DISCONTINUED | OUTPATIENT
Start: 2020-02-23 | End: 2020-02-23

## 2020-02-23 RX ORDER — SODIUM CHLORIDE 0.9 % (FLUSH) 0.9 %
10 SYRINGE (ML) INJECTION PRN
Status: DISCONTINUED | OUTPATIENT
Start: 2020-02-23 | End: 2020-02-24 | Stop reason: HOSPADM

## 2020-02-23 RX ORDER — 0.9 % SODIUM CHLORIDE 0.9 %
1000 INTRAVENOUS SOLUTION INTRAVENOUS ONCE
Status: COMPLETED | OUTPATIENT
Start: 2020-02-23 | End: 2020-02-23

## 2020-02-23 RX ORDER — SODIUM CHLORIDE 9 MG/ML
INJECTION, SOLUTION INTRAVENOUS ONCE
Status: COMPLETED | OUTPATIENT
Start: 2020-02-23 | End: 2020-02-23

## 2020-02-23 RX ORDER — 0.9 % SODIUM CHLORIDE 0.9 %
1000 INTRAVENOUS SOLUTION INTRAVENOUS ONCE
Status: DISCONTINUED | OUTPATIENT
Start: 2020-02-24 | End: 2020-02-23

## 2020-02-23 RX ORDER — KETOROLAC TROMETHAMINE 30 MG/ML
15 INJECTION, SOLUTION INTRAMUSCULAR; INTRAVENOUS ONCE
Status: DISCONTINUED | OUTPATIENT
Start: 2020-02-24 | End: 2020-02-23

## 2020-02-23 RX ORDER — SODIUM CHLORIDE 9 MG/ML
INJECTION, SOLUTION INTRAVENOUS CONTINUOUS
Status: DISCONTINUED | OUTPATIENT
Start: 2020-02-24 | End: 2020-02-24 | Stop reason: HOSPADM

## 2020-02-23 RX ORDER — SODIUM CHLORIDE 9 MG/ML
INJECTION, SOLUTION INTRAVENOUS ONCE
Status: DISCONTINUED | OUTPATIENT
Start: 2020-02-24 | End: 2020-02-23

## 2020-02-23 RX ORDER — ONDANSETRON 2 MG/ML
4 INJECTION INTRAMUSCULAR; INTRAVENOUS EVERY 6 HOURS PRN
Status: DISCONTINUED | OUTPATIENT
Start: 2020-02-23 | End: 2020-02-24 | Stop reason: HOSPADM

## 2020-02-23 RX ORDER — PROMETHAZINE HYDROCHLORIDE 25 MG/1
12.5 TABLET ORAL EVERY 6 HOURS PRN
Status: DISCONTINUED | OUTPATIENT
Start: 2020-02-23 | End: 2020-02-24 | Stop reason: HOSPADM

## 2020-02-23 RX ORDER — ACETAMINOPHEN 650 MG/1
650 SUPPOSITORY RECTAL EVERY 6 HOURS PRN
Status: DISCONTINUED | OUTPATIENT
Start: 2020-02-23 | End: 2020-02-24 | Stop reason: HOSPADM

## 2020-02-23 RX ORDER — POLYETHYLENE GLYCOL 3350 17 G/17G
17 POWDER, FOR SOLUTION ORAL DAILY PRN
Status: DISCONTINUED | OUTPATIENT
Start: 2020-02-23 | End: 2020-02-24 | Stop reason: HOSPADM

## 2020-02-23 RX ORDER — KETOROLAC TROMETHAMINE 30 MG/ML
15 INJECTION, SOLUTION INTRAMUSCULAR; INTRAVENOUS EVERY 6 HOURS PRN
Status: DISCONTINUED | OUTPATIENT
Start: 2020-02-23 | End: 2020-02-24 | Stop reason: HOSPADM

## 2020-02-23 RX ORDER — KETOROLAC TROMETHAMINE 30 MG/ML
15 INJECTION, SOLUTION INTRAMUSCULAR; INTRAVENOUS ONCE
Status: COMPLETED | OUTPATIENT
Start: 2020-02-23 | End: 2020-02-23

## 2020-02-23 RX ORDER — ACETAMINOPHEN 325 MG/1
650 TABLET ORAL EVERY 6 HOURS PRN
Status: DISCONTINUED | OUTPATIENT
Start: 2020-02-23 | End: 2020-02-24 | Stop reason: HOSPADM

## 2020-02-23 RX ORDER — TAMSULOSIN HYDROCHLORIDE 0.4 MG/1
0.4 CAPSULE ORAL DAILY
COMMUNITY
Start: 2020-02-19 | End: 2021-03-10

## 2020-02-23 RX ORDER — SODIUM CHLORIDE 0.9 % (FLUSH) 0.9 %
10 SYRINGE (ML) INJECTION EVERY 12 HOURS SCHEDULED
Status: DISCONTINUED | OUTPATIENT
Start: 2020-02-23 | End: 2020-02-24 | Stop reason: HOSPADM

## 2020-02-23 RX ADMIN — KETOROLAC TROMETHAMINE 15 MG: 30 INJECTION, SOLUTION INTRAMUSCULAR; INTRAVENOUS at 22:55

## 2020-02-23 RX ADMIN — Medication 10 ML: at 19:38

## 2020-02-23 RX ADMIN — SODIUM CHLORIDE: 9 INJECTION, SOLUTION INTRAVENOUS at 16:39

## 2020-02-23 RX ADMIN — ONDANSETRON HYDROCHLORIDE 4 MG: 2 INJECTION, SOLUTION INTRAMUSCULAR; INTRAVENOUS at 12:17

## 2020-02-23 RX ADMIN — ACETAMINOPHEN 650 MG: 325 TABLET ORAL at 19:44

## 2020-02-23 RX ADMIN — SODIUM CHLORIDE 1000 ML: 9 INJECTION, SOLUTION INTRAVENOUS at 12:16

## 2020-02-23 RX ADMIN — KETOROLAC TROMETHAMINE 15 MG: 30 INJECTION, SOLUTION INTRAMUSCULAR; INTRAVENOUS at 12:17

## 2020-02-23 RX ADMIN — ENOXAPARIN SODIUM 40 MG: 40 INJECTION SUBCUTANEOUS at 16:39

## 2020-02-23 ASSESSMENT — PAIN DESCRIPTION - PROGRESSION
CLINICAL_PROGRESSION: GRADUALLY WORSENING
CLINICAL_PROGRESSION: NOT CHANGED
CLINICAL_PROGRESSION: GRADUALLY WORSENING
CLINICAL_PROGRESSION: NOT CHANGED
CLINICAL_PROGRESSION: NOT CHANGED
CLINICAL_PROGRESSION: GRADUALLY WORSENING

## 2020-02-23 ASSESSMENT — PAIN DESCRIPTION - ORIENTATION
ORIENTATION: LEFT
ORIENTATION: ANTERIOR
ORIENTATION: LEFT
ORIENTATION: LEFT

## 2020-02-23 ASSESSMENT — PAIN - FUNCTIONAL ASSESSMENT
PAIN_FUNCTIONAL_ASSESSMENT: ACTIVITIES ARE NOT PREVENTED

## 2020-02-23 ASSESSMENT — PAIN DESCRIPTION - ONSET
ONSET: GRADUAL
ONSET: GRADUAL
ONSET: ON-GOING
ONSET: GRADUAL

## 2020-02-23 ASSESSMENT — ENCOUNTER SYMPTOMS
RESPIRATORY NEGATIVE: 1
NAUSEA: 1
VOMITING: 1

## 2020-02-23 ASSESSMENT — PAIN SCALES - GENERAL
PAINLEVEL_OUTOF10: 3
PAINLEVEL_OUTOF10: 7
PAINLEVEL_OUTOF10: 5
PAINLEVEL_OUTOF10: 4
PAINLEVEL_OUTOF10: 10
PAINLEVEL_OUTOF10: 3

## 2020-02-23 ASSESSMENT — PAIN DESCRIPTION - DESCRIPTORS
DESCRIPTORS: HEADACHE
DESCRIPTORS: DULL
DESCRIPTORS: DULL;SHARP
DESCRIPTORS: STABBING

## 2020-02-23 ASSESSMENT — PAIN DESCRIPTION - FREQUENCY
FREQUENCY: CONTINUOUS
FREQUENCY: INTERMITTENT

## 2020-02-23 ASSESSMENT — PAIN DESCRIPTION - LOCATION
LOCATION: FLANK
LOCATION: FLANK
LOCATION: HEAD
LOCATION: FLANK

## 2020-02-23 ASSESSMENT — PAIN DESCRIPTION - PAIN TYPE
TYPE: ACUTE PAIN

## 2020-02-23 NOTE — CONSULTS
2/23/2020  [facility]    Reason for Consult:  Left kidney stone, renal colic  Requesting Physician:  Hospitalist      History Obtained From:  patient, electronic medical record    HISTORY OF PRESENT ILLNESS:                The patient is a 51 y.o. female who presents with worse left renal colic. She was seen by Dr Dania Hawkins on Friday and has bee slated for surgery. She presented back to the ER with acute renal colic.     Past Medical History:        Diagnosis Date    Degeneration of lumbar or lumbosacral intervertebral disc 11/26/2018    Lateral epicondylitis of left elbow 10/9/2018    Left carpal tunnel syndrome 11/19/2018    Menorrhagia 9/2011     Past Surgical History:        Procedure Laterality Date    CHOLECYSTECTOMY  01/07/2020    LAPAROSCOPIC CHOLECYSTECTOMY     CHOLECYSTECTOMY, LAPAROSCOPIC N/A 1/7/2020    LAPAROSCOPIC CHOLECYSTECTOMY performed by Marta Will MD at [facility] Bilateral 1/10/2019    BILATERAL LUMBAR THREE, LUMBAR FOUR, LUMBAR FIVE RADIOFREQUENCY ABLATION SITE CONFIRMED BY FLUOROSCOPY performed by Edinson Carrasco MD at [address] HYSTEROSCOPY  09/27/2011    Dilation and Curetatge novasure ablation    TONSILLECTOMY      WISDOM TOOTH EXTRACTION  1987     Current Medications:   Current Facility-Administered Medications: ondansetron (ZOFRAN) injection 4 mg, 4 mg, Intravenous, Q6H PRN  Allergies:  No Known Allergies  Social History:  Reviewed, non contributory    Family History:  Reviewed, non contributory      REVIEW OF SYSTEMS:    12 point ROS has been completed and reviewed    PHYSICAL EXAM:    VITALS:  BP (!) 146/97   Pulse 108 Comment: alot of pain  Temp 98 °F (36.7 °C) (Oral)   Resp 19   Ht 5' 1\" (1.549 m)   Wt 200 lb (90.7 kg)   SpO2 95%   BMI 37.79 kg/m²   H&N: Sclera normal, no masses, trachea midline, no bruit  CVS: Normal rate and rhythm, no murmurs or rubs, peripheral pulses equal, no clubbing or cyanosis. RESP: Breath sounds equal bilateral, few rhonchi. ABDO: Soft, non-tender, bowel sounds active, no organomegaly, no hernias. LYMPH:  No lymphadenopathy. Skin: Warm dry and intact. : Moderate left CVAT, normal external genitalia, no discharge. MSK: Grossly normal for patient  STEPHANY: Grossly normal for patient  PSY: No acute changes noted in psychosocial assessment. DATA:    CBC:   Lab Results   Component Value Date    WBC 12.4 02/23/2020    RBC 5.38 02/23/2020    HGB 14.9 02/23/2020    HCT 45.2 02/23/2020    MCV 84.0 02/23/2020    MCH 27.6 02/23/2020    MCHC 32.9 02/23/2020    RDW 13.5 02/23/2020     02/23/2020    MPV 7.1 02/23/2020     BMP:    Lab Results   Component Value Date     02/23/2020    K 4.0 02/23/2020    CL 99 02/23/2020    CO2 23 02/23/2020    BUN 11 02/23/2020    LABALBU 4.1 02/23/2020    CREATININE 0.9 02/23/2020    CALCIUM 9.2 02/23/2020    GFRAA >60 02/23/2020    LABGLOM >60 02/23/2020    GLUCOSE 175 02/23/2020     U/A:    Lab Results   Component Value Date    COLORU Yellow 02/23/2020    PROTEINU Negative 02/23/2020    PHUR 6.0 02/23/2020    CLARITYU Clear 02/23/2020    SPECGRAV 1.015 02/23/2020    LEUKOCYTESUR Negative 02/23/2020    UROBILINOGEN 0.2 02/23/2020    BILIRUBINUR Negative 02/23/2020    BLOODU SMALL 02/23/2020    GLUCOSEU Negative 02/23/2020     CT reviewed. IMPRESSION/RECOMMENDATIONS:      Left UPJ stones with recurrent renal colic. To be admitted by medical service for fluids and pain control. Keep NPO after midnight. I will place her on the schedule for a cysto and left ureteroscopy/stent for tomorrow afternoon. Thank you for asking me to see this interesting patient.     RAQUEL Wilkerson

## 2020-02-23 NOTE — PROGRESS NOTES
Admit to med-surg  7 mm and 4 mm stone left UPJ, worsening pain    NPO after midnight  Urology consult    Dudley Lay FNP-C

## 2020-02-23 NOTE — ED PROVIDER NOTES
Negative. Cardiovascular: Negative. Gastrointestinal: Positive for nausea and vomiting. Genitourinary: Positive for flank pain. Skin: Negative. Positives and Pertinent negatives as per HPI. Except as noted above in the ROS, all other systems were reviewed and negative. PAST MEDICAL HISTORY     Past Medical History:   Diagnosis Date    Degeneration of lumbar or lumbosacral intervertebral disc 11/26/2018    Lateral epicondylitis of left elbow 10/9/2018    Left carpal tunnel syndrome 11/19/2018    Menorrhagia 9/2011         SURGICAL HISTORY     Past Surgical History:   Procedure Laterality Date    CHOLECYSTECTOMY  01/07/2020    LAPAROSCOPIC CHOLECYSTECTOMY     CHOLECYSTECTOMY, LAPAROSCOPIC N/A 1/7/2020    LAPAROSCOPIC CHOLECYSTECTOMY performed by Anjelica Matthews MD at [address] Bilateral 1/10/2019    BILATERAL LUMBAR THREE, LUMBAR FOUR, LUMBAR FIVE RADIOFREQUENCY ABLATION SITE CONFIRMED BY FLUOROSCOPY performed by Shekhar Velarde MD at [address] HYSTEROSCOPY  09/27/2011    Dilation and Curetatge novasure ablation    TONSILLECTOMY      WISDOM TOOTH EXTRACTION  1987         CURRENTMEDICATIONS       Previous Medications    No medications on file         ALLERGIES     Patient has no known allergies.     FAMILYHISTORY       Family History   Problem Relation Age of Onset    Cancer Mother         Breast    No Known Problems Father     No Known Problems Sister     No Known Problems Brother     No Known Problems Maternal Aunt     No Known Problems Maternal Uncle     No Known Problems Paternal Aunt     No Known Problems Paternal Uncle     No Known Problems Maternal Grandmother     No Known Problems Maternal Grandfather     No Known Problems Paternal Grandmother     No Known Problems Paternal Grandfather     No Known Problems Other     Anesth Problems Neg Hx     Broken Bones Neg Hx     Clotting Disorder Neg Hx     Collagen Disease Neg Hx rebound. Musculoskeletal: Normal range of motion. General: No deformity. Skin:     General: Skin is warm and dry. Neurological:      General: No focal deficit present. Mental Status: She is alert and oriented to person, place, and time. GCS: GCS eye subscore is 4. GCS verbal subscore is 5. GCS motor subscore is 6. Psychiatric:         Behavior: Behavior normal. Behavior is cooperative. DIAGNOSTIC RESULTS   LABS:    Labs Reviewed   CBC WITH AUTO DIFFERENTIAL - Abnormal; Notable for the following components:       Result Value    WBC 12.4 (*)     RBC 5.38 (*)     Neutrophils Absolute 9.5 (*)     All other components within normal limits    Narrative:     Performed at:  [facility],  [place], [place]   Phone [phone]   COMPREHENSIVE METABOLIC PANEL W/ REFLEX TO MG FOR LOW K - Abnormal; Notable for the following components:    Sodium 134 (*)     Glucose 175 (*)     ALT 8 (*)     AST 10 (*)     All other components within normal limits    Narrative:     Performed at:  [facility],  [place], [place]   Phone [phone]   URINE RT REFLEX TO CULTURE - Abnormal; Notable for the following components:    Blood, Urine SMALL (*)     All other components within normal limits    Narrative:     Performed at:  [facility] [address],  [place], [place] [employer]   Phone [phone]   LIPASE    Narrative:     Performed at:  [facility],  [place], [employer]   Phone [phone]   PREGNANCY, URINE    Narrative:     Performed at:  [facility],  [place], [employer]   Phone [phone]   MICROSCOPIC URINALYSIS       All other labs were within normal range or not returned as of this dictation. EKG:  All EKG's are interpreted by the Emergency Loops of small and large bowel are nondilated. Appendix is within normal limits in caliber. Pelvis: Pelvic phleboliths. Uterus is present. Bladder is grossly unremarkable. No inguinal adenopathy. Peritoneum/Retroperitoneum: No free air. Bones/Soft Tissues: Degenerative change of the spine. 7 mm and 4 mm calculi at the left UPJ with moderate left hydronephrosis. Additional nonobstructing left nephrolithiasis. Xr Abdomen (kub) (single Ap View)    Result Date: 2/21/2020  EXAMINATION: ONE SUPINE XRAY VIEW(S) OF THE ABDOMEN 2/21/2020 3:15 pm COMPARISON: CT abdomen and pelvis 02/19/2020 HISTORY: ORDERING SYSTEM PROVIDED HISTORY: Abdominal pain, unspecified abdominal location TECHNOLOGIST PROVIDED HISTORY: Reason for exam:->abd. pain; kidney stone Reason for Exam: left flank pain; dx w/ left side kidney stone on 2/19/2020; personal hx of cholecystectomy; Acuity: Acute Type of Exam: Subsequent/Follow-up Relevant Medical/Surgical History: see epic FINDINGS: Stones were seen in the proximal left ureter on the CT exam are not clearly identified along the expected course of the left ureter on the radiographs. Faint calcifications overlie the lower pole of the left kidney in 1 of the views, correlating with left lower pole renal calculi demonstrated on the CT scan. Bowel-gas pattern normal.  Cholecystectomy clips noted     1. Left ureteral stone demonstrated on the comparison CT exam not demonstrated radiographically 2.  Left nephrolithiasis           PROCEDURES   Unless otherwise noted below, none     Procedures    CRITICAL CARE TIME   N/A    CONSULTS:  IP CONSULT TO HOSPITALIST      EMERGENCY DEPARTMENT COURSE and DIFFERENTIAL DIAGNOSIS/MDM:   Vitals:    Vitals:    02/23/20 1200 02/23/20 1246 02/23/20 1247 02/23/20 1307   BP:   (!) 151/90 (!) 146/97   Pulse: 108      Resp:       Temp: 98 °F (36.7 °C)      TempSrc: Oral      SpO2:  97% 95% 95%   Weight:       Height:           Patient was given the following medications:  Medications   ondansetron (ZOFRAN) injection 4 mg (4 mg Intravenous Given 2/23/20 1217)   ketorolac (TORADOL) injection 15 mg (15 mg Intravenous Given 2/23/20 1217)   0.9 % sodium chloride bolus (0 mLs Intravenous Stopped 2/23/20 1342)       Patient brought in today for complaints of left flank pain. On exam she is alert oriented afebrile breathing on room air satting 100%. Nontoxic no acute distress. Patient does appear uncomfortable. She was initially given fluids Toradol and Zofran here. Her CT scan showed a 7 mm and 4 mm calculi at the left UPJ with moderate left hydronephrosis. There also appears to be additional nonobstructing left nephrolithiasis. She has been following with urology and saw urology on Friday who was planning to surgically remove the stones however the pain became so severe that she decided to come into the ER at this time. CBC reveals a leukocytosis of 12.4. Hemoglobin of 14.9. Kidney function is unremarkable. No electrolyte abnormalities. Lipase of 20. Urine pregnancy is negative. Dr. Nora Caba with urology came into the ED to see patient and he recommended admission to the hospitalist team at this time. Plan will be to admit. I spoke to nurse practitioner Sami Livingston who agreed to admit this patient at this time. Patient updated and stable at time of admission. Patient seen by my attending as well. FINAL IMPRESSION      1. Kidney stone    2. Other hydronephrosis          DISPOSITION/PLAN   DISPOSITION Decision To Admit 02/23/2020 02:03:16 PM      PATIENT REFERREDTO:  No follow-up provider specified. DISCHARGE MEDICATIONS:  New Prescriptions    No medications on file       DISCONTINUED MEDICATIONS:  Discontinued Medications    GABAPENTIN (NEURONTIN) 100 MG CAPSULE    Take 3 capsules by mouth 3 times daily as needed (nerve pain) for up to 5 days.     HISTAMINE-MENTHOL (ACTIVON ARTHRITIS ULTRA ST EX)    Apply topically              (Please note that portions

## 2020-02-23 NOTE — ED NOTES
Perfect served hospitalist    @ [address] [place] [address], [address] [place] [address]  02/23/20 [address] called back    @ [address] [place] [address], [address] [place] [address]  02/23/20 1407

## 2020-02-23 NOTE — PROGRESS NOTES
4 Eyes Skin Assessment     The patient is being assess for   Admission    I agree that 2 RN's have performed a thorough Head to Toe Skin Assessment on the patient. ALL assessment sites listed below have been assessed. Areas assessed by both nurses:   [x]   Head, Face, and Ears   [x]   Shoulders, Back, and Chest, Abdomen  [x]   Arms, Elbows, and Hands   [x]   Coccyx, Sacrum, and Ischium  [x]   Legs, Feet, and Heels        Slight redness to right abd/groin fold. Area dry. Slight pink noted to bilateral breast folds. Area also dry. No noted skin concerns. **SHARE this note so that the co-signing nurse is able to place an eSignature**    Co-signer eSignature: Electronically signed by Jeffery Montenegro RN on 2/23/20 at 7:02 PM    Does the Patient have Skin Breakdown?   No          Bharat Prevention initiated:  NA   Wound Care Orders initiated:  NA      [facility] nurse consulted for Pressure Injury (Stage 3,4, Unstageable, DTI, NWPT, Complex wounds)and New or Established Ostomies:  NA      Primary Nurse eSignature: Electronically signed by Jean-Paul Martinez RN on 2/23/20 at 6:35 PM

## 2020-02-23 NOTE — FLOWSHEET NOTE
02/23/20 1545   Vital Signs   Temp 97.3 °F (36.3 °C)   Temp Source Oral   Pulse 100   Heart Rate Source Monitor   Resp 16   BP (!) 150/97   BP Location Right upper arm   BP Upper/Lower Upper   Patient Position Sitting   Level of Consciousness 0   MEWS Score 1   Patient Currently in Pain Yes   Pain Assessment   Pain Assessment 0-10   Pain Level 3   Pain Type Acute pain   Pain Location Flank   Pain Orientation Left   Pain Descriptors Dull   Pain Frequency Continuous   Pain Onset On-going   Clinical Progression Not changed   Patient's Stated Pain Goal 2   Functional Pain Assessment Activities are not prevented   Oxygen Therapy   SpO2 98 %   O2 Device None (Room air)   Pt brought up from ER. Oriented to room and call light. Pt given hospital gown and socks. Admission questions complete. Pt with some discomfort in left flank, but is much more comfortable than she was earlier in the ER. Pt given broth and water per request.  Dietary notified of pt arrival and will be up to get pt a dinner ordered. Pt aware to void over hat so that urine can be strained. Pt updated on current POC and any questions answered. Pt denies needs at this time. Will monitor.

## 2020-02-24 ENCOUNTER — ANESTHESIA (OUTPATIENT)
Dept: OPERATING ROOM | Age: 50
End: 2020-02-24
Payer: COMMERCIAL

## 2020-02-24 ENCOUNTER — APPOINTMENT (OUTPATIENT)
Dept: GENERAL RADIOLOGY | Age: 50
End: 2020-02-24
Payer: COMMERCIAL

## 2020-02-24 ENCOUNTER — ANESTHESIA EVENT (OUTPATIENT)
Dept: OPERATING ROOM | Age: 50
End: 2020-02-24
Payer: COMMERCIAL

## 2020-02-24 VITALS
RESPIRATION RATE: 1 BRPM | SYSTOLIC BLOOD PRESSURE: 99 MMHG | OXYGEN SATURATION: 97 % | DIASTOLIC BLOOD PRESSURE: 57 MMHG

## 2020-02-24 VITALS
HEIGHT: 61 IN | DIASTOLIC BLOOD PRESSURE: 95 MMHG | WEIGHT: 200 LBS | OXYGEN SATURATION: 94 % | BODY MASS INDEX: 37.76 KG/M2 | SYSTOLIC BLOOD PRESSURE: 146 MMHG | TEMPERATURE: 96.7 F | RESPIRATION RATE: 18 BRPM | HEART RATE: 96 BPM

## 2020-02-24 LAB
ANION GAP SERPL CALCULATED.3IONS-SCNC: 12 MMOL/L (ref 3–16)
BUN BLDV-MCNC: 13 MG/DL (ref 7–20)
CALCIUM SERPL-MCNC: 8.6 MG/DL (ref 8.3–10.6)
CHLORIDE BLD-SCNC: 99 MMOL/L (ref 99–110)
CO2: 17 MMOL/L (ref 21–32)
CREAT SERPL-MCNC: 1 MG/DL (ref 0.6–1.1)
GFR AFRICAN AMERICAN: >60
GFR NON-AFRICAN AMERICAN: 59
GLUCOSE BLD-MCNC: 156 MG/DL (ref 70–99)
HCT VFR BLD CALC: 41.6 % (ref 36–48)
HEMOGLOBIN: 13.7 G/DL (ref 12–16)
MCH RBC QN AUTO: 28.4 PG (ref 26–34)
MCHC RBC AUTO-ENTMCNC: 32.8 G/DL (ref 31–36)
MCV RBC AUTO: 86.6 FL (ref 80–100)
PDW BLD-RTO: 14.3 % (ref 12.4–15.4)
PLATELET # BLD: 308 K/UL (ref 135–450)
PMV BLD AUTO: 7.2 FL (ref 5–10.5)
POTASSIUM REFLEX MAGNESIUM: 4.8 MMOL/L (ref 3.5–5.1)
RBC # BLD: 4.81 M/UL (ref 4–5.2)
SODIUM BLD-SCNC: 128 MMOL/L (ref 136–145)
WBC # BLD: 7.9 K/UL (ref 4–11)

## 2020-02-24 PROCEDURE — 2720000010 HC SURG SUPPLY STERILE: Performed by: UROLOGY

## 2020-02-24 PROCEDURE — 3700000001 HC ADD 15 MINUTES (ANESTHESIA): Performed by: UROLOGY

## 2020-02-24 PROCEDURE — 7100000001 HC PACU RECOVERY - ADDTL 15 MIN: Performed by: UROLOGY

## 2020-02-24 PROCEDURE — G0378 HOSPITAL OBSERVATION PER HR: HCPCS

## 2020-02-24 PROCEDURE — 6360000002 HC RX W HCPCS: Performed by: NURSE ANESTHETIST, CERTIFIED REGISTERED

## 2020-02-24 PROCEDURE — 88300 SURGICAL PATH GROSS: CPT

## 2020-02-24 PROCEDURE — 3600000004 HC SURGERY LEVEL 4 BASE: Performed by: UROLOGY

## 2020-02-24 PROCEDURE — 2500000003 HC RX 250 WO HCPCS: Performed by: ANESTHESIOLOGY

## 2020-02-24 PROCEDURE — C2617 STENT, NON-COR, TEM W/O DEL: HCPCS | Performed by: UROLOGY

## 2020-02-24 PROCEDURE — 6360000002 HC RX W HCPCS: Performed by: UROLOGY

## 2020-02-24 PROCEDURE — 2500000003 HC RX 250 WO HCPCS: Performed by: NURSE ANESTHETIST, CERTIFIED REGISTERED

## 2020-02-24 PROCEDURE — 3600000014 HC SURGERY LEVEL 4 ADDTL 15MIN: Performed by: UROLOGY

## 2020-02-24 PROCEDURE — 7100000000 HC PACU RECOVERY - FIRST 15 MIN: Performed by: UROLOGY

## 2020-02-24 PROCEDURE — 96375 TX/PRO/DX INJ NEW DRUG ADDON: CPT

## 2020-02-24 PROCEDURE — 2580000003 HC RX 258: Performed by: INTERNAL MEDICINE

## 2020-02-24 PROCEDURE — 82365 CALCULUS SPECTROSCOPY: CPT

## 2020-02-24 PROCEDURE — 80048 BASIC METABOLIC PNL TOTAL CA: CPT

## 2020-02-24 PROCEDURE — 85027 COMPLETE CBC AUTOMATED: CPT

## 2020-02-24 PROCEDURE — 2580000003 HC RX 258: Performed by: UROLOGY

## 2020-02-24 PROCEDURE — 74420 UROGRAPHY RTRGR +-KUB: CPT

## 2020-02-24 PROCEDURE — C1758 CATHETER, URETERAL: HCPCS | Performed by: UROLOGY

## 2020-02-24 PROCEDURE — C1769 GUIDE WIRE: HCPCS | Performed by: UROLOGY

## 2020-02-24 PROCEDURE — 99217 PR OBSERVATION CARE DISCHARGE MANAGEMENT: CPT | Performed by: INTERNAL MEDICINE

## 2020-02-24 PROCEDURE — 6360000002 HC RX W HCPCS: Performed by: NURSE PRACTITIONER

## 2020-02-24 PROCEDURE — 36415 COLL VENOUS BLD VENIPUNCTURE: CPT

## 2020-02-24 PROCEDURE — 3700000000 HC ANESTHESIA ATTENDED CARE: Performed by: UROLOGY

## 2020-02-24 PROCEDURE — 96376 TX/PRO/DX INJ SAME DRUG ADON: CPT

## 2020-02-24 PROCEDURE — 2580000003 HC RX 258: Performed by: ANESTHESIOLOGY

## 2020-02-24 PROCEDURE — 2709999900 HC NON-CHARGEABLE SUPPLY: Performed by: UROLOGY

## 2020-02-24 PROCEDURE — 6360000004 HC RX CONTRAST MEDICATION: Performed by: UROLOGY

## 2020-02-24 DEVICE — URETERAL STENT
Type: IMPLANTABLE DEVICE | Site: URETER | Status: FUNCTIONAL
Brand: CONTOUR™

## 2020-02-24 RX ORDER — LIDOCAINE HYDROCHLORIDE 20 MG/ML
INJECTION, SOLUTION INFILTRATION; PERINEURAL PRN
Status: DISCONTINUED | OUTPATIENT
Start: 2020-02-24 | End: 2020-02-24 | Stop reason: SDUPTHER

## 2020-02-24 RX ORDER — MAGNESIUM HYDROXIDE 1200 MG/15ML
LIQUID ORAL PRN
Status: DISCONTINUED | OUTPATIENT
Start: 2020-02-24 | End: 2020-02-24 | Stop reason: ALTCHOICE

## 2020-02-24 RX ORDER — DEXAMETHASONE SODIUM PHOSPHATE 4 MG/ML
INJECTION, SOLUTION INTRA-ARTICULAR; INTRALESIONAL; INTRAMUSCULAR; INTRAVENOUS; SOFT TISSUE PRN
Status: DISCONTINUED | OUTPATIENT
Start: 2020-02-24 | End: 2020-02-24 | Stop reason: SDUPTHER

## 2020-02-24 RX ORDER — FENTANYL CITRATE 50 UG/ML
INJECTION, SOLUTION INTRAMUSCULAR; INTRAVENOUS PRN
Status: DISCONTINUED | OUTPATIENT
Start: 2020-02-24 | End: 2020-02-24 | Stop reason: SDUPTHER

## 2020-02-24 RX ORDER — SULFAMETHOXAZOLE AND TRIMETHOPRIM 400; 80 MG/1; MG/1
1 TABLET ORAL 2 TIMES DAILY
Qty: 8 TABLET | Refills: 0 | Status: SHIPPED | OUTPATIENT
Start: 2020-02-24 | End: 2020-02-28

## 2020-02-24 RX ORDER — ONDANSETRON 2 MG/ML
4 INJECTION INTRAMUSCULAR; INTRAVENOUS
Status: DISCONTINUED | OUTPATIENT
Start: 2020-02-24 | End: 2020-02-24 | Stop reason: HOSPADM

## 2020-02-24 RX ORDER — MORPHINE SULFATE 10 MG/ML
2 INJECTION, SOLUTION INTRAMUSCULAR; INTRAVENOUS EVERY 5 MIN PRN
Status: DISCONTINUED | OUTPATIENT
Start: 2020-02-24 | End: 2020-02-24 | Stop reason: HOSPADM

## 2020-02-24 RX ORDER — OXYCODONE HYDROCHLORIDE AND ACETAMINOPHEN 5; 325 MG/1; MG/1
2 TABLET ORAL PRN
Status: DISCONTINUED | OUTPATIENT
Start: 2020-02-24 | End: 2020-02-24 | Stop reason: HOSPADM

## 2020-02-24 RX ORDER — SODIUM CHLORIDE 0.9 % (FLUSH) 0.9 %
10 SYRINGE (ML) INJECTION EVERY 12 HOURS SCHEDULED
Status: DISCONTINUED | OUTPATIENT
Start: 2020-02-24 | End: 2020-02-24 | Stop reason: HOSPADM

## 2020-02-24 RX ORDER — SODIUM CHLORIDE 0.9 % (FLUSH) 0.9 %
10 SYRINGE (ML) INJECTION PRN
Status: DISCONTINUED | OUTPATIENT
Start: 2020-02-24 | End: 2020-02-24 | Stop reason: HOSPADM

## 2020-02-24 RX ORDER — TAMSULOSIN HYDROCHLORIDE 0.4 MG/1
0.4 CAPSULE ORAL DAILY
Status: DISCONTINUED | OUTPATIENT
Start: 2020-02-24 | End: 2020-02-24 | Stop reason: HOSPADM

## 2020-02-24 RX ORDER — MORPHINE SULFATE 10 MG/ML
1 INJECTION, SOLUTION INTRAMUSCULAR; INTRAVENOUS EVERY 5 MIN PRN
Status: DISCONTINUED | OUTPATIENT
Start: 2020-02-24 | End: 2020-02-24 | Stop reason: HOSPADM

## 2020-02-24 RX ORDER — ONDANSETRON 2 MG/ML
INJECTION INTRAMUSCULAR; INTRAVENOUS PRN
Status: DISCONTINUED | OUTPATIENT
Start: 2020-02-24 | End: 2020-02-24 | Stop reason: SDUPTHER

## 2020-02-24 RX ORDER — OXYCODONE HYDROCHLORIDE AND ACETAMINOPHEN 5; 325 MG/1; MG/1
0.5 TABLET ORAL EVERY 4 HOURS PRN
Qty: 20 TABLET | Refills: 0 | Status: SHIPPED | OUTPATIENT
Start: 2020-02-24 | End: 2020-03-02

## 2020-02-24 RX ORDER — PROPOFOL 10 MG/ML
INJECTION, EMULSION INTRAVENOUS PRN
Status: DISCONTINUED | OUTPATIENT
Start: 2020-02-24 | End: 2020-02-24 | Stop reason: SDUPTHER

## 2020-02-24 RX ORDER — PHENAZOPYRIDINE HYDROCHLORIDE 200 MG/1
200 TABLET, FILM COATED ORAL 3 TIMES DAILY PRN
Qty: 15 TABLET | Refills: 0 | Status: SHIPPED | OUTPATIENT
Start: 2020-02-24 | End: 2020-02-29

## 2020-02-24 RX ORDER — OXYCODONE HYDROCHLORIDE AND ACETAMINOPHEN 5; 325 MG/1; MG/1
1 TABLET ORAL PRN
Status: DISCONTINUED | OUTPATIENT
Start: 2020-02-24 | End: 2020-02-24 | Stop reason: HOSPADM

## 2020-02-24 RX ORDER — SODIUM CHLORIDE, SODIUM LACTATE, POTASSIUM CHLORIDE, CALCIUM CHLORIDE 600; 310; 30; 20 MG/100ML; MG/100ML; MG/100ML; MG/100ML
INJECTION, SOLUTION INTRAVENOUS CONTINUOUS
Status: DISCONTINUED | OUTPATIENT
Start: 2020-02-24 | End: 2020-02-24

## 2020-02-24 RX ADMIN — DEXAMETHASONE SODIUM PHOSPHATE 8 MG: 4 INJECTION, SOLUTION INTRAMUSCULAR; INTRAVENOUS at 14:44

## 2020-02-24 RX ADMIN — FENTANYL CITRATE 25 MCG: 50 INJECTION INTRAMUSCULAR; INTRAVENOUS at 15:16

## 2020-02-24 RX ADMIN — FAMOTIDINE 20 MG: 10 INJECTION, SOLUTION INTRAVENOUS at 13:08

## 2020-02-24 RX ADMIN — ONDANSETRON 4 MG: 2 INJECTION, SOLUTION INTRAMUSCULAR; INTRAVENOUS at 14:44

## 2020-02-24 RX ADMIN — SODIUM CHLORIDE: 9 INJECTION, SOLUTION INTRAVENOUS at 13:08

## 2020-02-24 RX ADMIN — SODIUM CHLORIDE: 9 INJECTION, SOLUTION INTRAVENOUS at 01:55

## 2020-02-24 RX ADMIN — FENTANYL CITRATE 50 MCG: 50 INJECTION INTRAMUSCULAR; INTRAVENOUS at 14:39

## 2020-02-24 RX ADMIN — FENTANYL CITRATE 25 MCG: 50 INJECTION INTRAMUSCULAR; INTRAVENOUS at 15:26

## 2020-02-24 RX ADMIN — Medication 2 G: at 14:33

## 2020-02-24 RX ADMIN — SODIUM CHLORIDE, POTASSIUM CHLORIDE, SODIUM LACTATE AND CALCIUM CHLORIDE: 600; 310; 30; 20 INJECTION, SOLUTION INTRAVENOUS at 15:17

## 2020-02-24 RX ADMIN — KETOROLAC TROMETHAMINE 15 MG: 30 INJECTION, SOLUTION INTRAMUSCULAR; INTRAVENOUS at 10:39

## 2020-02-24 RX ADMIN — LIDOCAINE HYDROCHLORIDE 40 MG: 20 INJECTION, SOLUTION INFILTRATION; PERINEURAL at 14:39

## 2020-02-24 RX ADMIN — FENTANYL CITRATE 50 MCG: 50 INJECTION INTRAMUSCULAR; INTRAVENOUS at 14:58

## 2020-02-24 RX ADMIN — FENTANYL CITRATE 50 MCG: 50 INJECTION INTRAMUSCULAR; INTRAVENOUS at 14:49

## 2020-02-24 RX ADMIN — PROPOFOL 200 MG: 10 INJECTION, EMULSION INTRAVENOUS at 14:39

## 2020-02-24 RX ADMIN — SODIUM CHLORIDE, POTASSIUM CHLORIDE, SODIUM LACTATE AND CALCIUM CHLORIDE: 600; 310; 30; 20 INJECTION, SOLUTION INTRAVENOUS at 14:35

## 2020-02-24 ASSESSMENT — PULMONARY FUNCTION TESTS
PIF_VALUE: 13
PIF_VALUE: 0
PIF_VALUE: 0
PIF_VALUE: 13
PIF_VALUE: 13
PIF_VALUE: 8
PIF_VALUE: 13
PIF_VALUE: 13
PIF_VALUE: 0
PIF_VALUE: 13
PIF_VALUE: 10
PIF_VALUE: 6
PIF_VALUE: 0
PIF_VALUE: 13
PIF_VALUE: 13
PIF_VALUE: 0
PIF_VALUE: 0
PIF_VALUE: 13
PIF_VALUE: 13
PIF_VALUE: 0
PIF_VALUE: 13
PIF_VALUE: 13
PIF_VALUE: 5
PIF_VALUE: 13
PIF_VALUE: 1
PIF_VALUE: 13
PIF_VALUE: 13
PIF_VALUE: 0
PIF_VALUE: 13
PIF_VALUE: 0
PIF_VALUE: 10
PIF_VALUE: 0
PIF_VALUE: 0
PIF_VALUE: 5
PIF_VALUE: 14
PIF_VALUE: 13
PIF_VALUE: 7
PIF_VALUE: 1
PIF_VALUE: 13
PIF_VALUE: 14
PIF_VALUE: 13
PIF_VALUE: 0
PIF_VALUE: 14
PIF_VALUE: 13
PIF_VALUE: 14
PIF_VALUE: 13
PIF_VALUE: 13
PIF_VALUE: 0
PIF_VALUE: 13
PIF_VALUE: 5
PIF_VALUE: 0
PIF_VALUE: 13
PIF_VALUE: 13
PIF_VALUE: 14
PIF_VALUE: 13
PIF_VALUE: 13

## 2020-02-24 ASSESSMENT — PAIN DESCRIPTION - LOCATION: LOCATION: FLANK

## 2020-02-24 ASSESSMENT — PAIN DESCRIPTION - FREQUENCY: FREQUENCY: INTERMITTENT

## 2020-02-24 ASSESSMENT — PAIN DESCRIPTION - PAIN TYPE: TYPE: ACUTE PAIN

## 2020-02-24 ASSESSMENT — PAIN DESCRIPTION - PROGRESSION: CLINICAL_PROGRESSION: NOT CHANGED

## 2020-02-24 ASSESSMENT — PAIN SCALES - GENERAL
PAINLEVEL_OUTOF10: 8
PAINLEVEL_OUTOF10: 0

## 2020-02-24 ASSESSMENT — PAIN DESCRIPTION - ORIENTATION: ORIENTATION: LEFT

## 2020-02-24 ASSESSMENT — PAIN DESCRIPTION - DESCRIPTORS: DESCRIPTORS: STABBING

## 2020-02-24 ASSESSMENT — ENCOUNTER SYMPTOMS: SHORTNESS OF BREATH: 0

## 2020-02-24 ASSESSMENT — PAIN DESCRIPTION - ONSET: ONSET: GRADUAL

## 2020-02-24 ASSESSMENT — LIFESTYLE VARIABLES: SMOKING_STATUS: 1

## 2020-02-24 NOTE — PROGRESS NOTES
Arrived from OR with LMA in place and respirations unlabored. No vaginal drainage noted at this time. Report received from Augusta James RN.  No c/o pain at this time

## 2020-02-24 NOTE — ANESTHESIA PRE PROCEDURE
Department of Anesthesiology  Preprocedure Note       Name:  Espinoza Murrieta   Age:  51 y.o.  :  1970                                          MRN:  [id number]         Date:  2020      Surgeon: Camille Chew):  Dariela Mckeon MD    Procedure: CYSTOSCOPY, URETEROSCOPY WITH POSSIBLE HOLMIUM LASER LITHOTRIPSY, POSSIBLE STENT PLACEMENT (Left Bladder)    Medications prior to admission:   Prior to Admission medications    Medication Sig Start Date End Date Taking?  Authorizing Provider   tamsulosin (FLOMAX) 0.4 MG capsule Take 0.4 mg by mouth daily 20   Historical Provider, MD       Current medications:    Current Facility-Administered Medications   Medication Dose Route Frequency Provider Last Rate Last Dose    lactated ringers infusion   Intravenous Continuous Osman Radford MD        sodium chloride flush 0.9 % injection 10 mL  10 mL Intravenous 2 times per day Osman Radford MD        sodium chloride flush 0.9 % injection 10 mL  10 mL Intravenous PRN Osman Radford MD        ceFAZolin (ANCEF) 2 g in sterile water 20 mL IV syringe  2 g Intravenous Once Dariela Mckeon MD        sodium chloride flush 0.9 % injection 10 mL  10 mL Intravenous 2 times per day Sharlett Klinefelter, APRN - CNP   10 mL at 20 1938    sodium chloride flush 0.9 % injection 10 mL  10 mL Intravenous PRN Sharlett Klinefelter, APRN - CNP        acetaminophen (TYLENOL) tablet 650 mg  650 mg Oral Q6H PRN Sharlett Klinefelter, APRN - CNP   650 mg at 20 1944    acetaminophen (TYLENOL) suppository 650 mg  650 mg Rectal Q6H PRN Lalito Vazquezfeltlizy, APRN - CNP        polyethylene glycol (GLYCOLAX) packet 17 g  17 g Oral Daily PRN Lalito Vivarinefeltlizy, APRN - CNP        promethazine (PHENERGAN) tablet 12.5 mg  12.5 mg Oral Q6H PRN Lalito Vivarinefelter, APRN - CNP        ondansetron (ZOFRAN) injection 4 mg  4 mg Intravenous Q6H PRN Lalito Vivarinefelter, APRN - CNP        ketorolac (TORADOL) injection 15 Yash Venegas in the last 72 hours. Coags: No results found for: PROTIME, INR, APTT    HCG (If Applicable):   Lab Results   Component Value Date    PREGTESTUR Negative 02/23/2020        ABGs: No results found for: PHART, PO2ART, CKM7KHL, XDR3VFS, BEART, J1LOUVBE     Type & Screen (If Applicable):  No results found for: LABABO, [address]    Anesthesia Evaluation  Patient summary reviewed and Nursing notes reviewed no history of anesthetic complications:   Airway: Mallampati: III  TM distance: <3 FB   Neck ROM: full  Mouth opening: < 3 FB Dental:          Pulmonary: breath sounds clear to auscultation  (+) current smoker (1 ppd)    (-) COPD, asthma and shortness of breath          Patient did not smoke on day of surgery. Cardiovascular:Negative CV ROS        (-) pacemaker, hypertension, past MI, CAD, CABG/stent, dysrhythmias,  angina and  CHF    ECG reviewed  Rhythm: regular  Rate: normal           Beta Blocker:  Not on Beta Blocker         Neuro/Psych:   (+) neuromuscular disease (ddd, lumbar / cts):,    (-) seizures, TIA and CVA           GI/Hepatic/Renal:   (+) renal disease: kidney stones,      (-) GERD, PUD and no morbid obesity       Endo/Other:    (+) no malignancy/cancer. (-) diabetes mellitus, hypothyroidism, hyperthyroidism, blood dyscrasia, no malignancy/cancer               Abdominal:   (+) obese,     Abdomen: soft. Vascular: negative vascular ROS. Anesthesia Plan      general     ASA 2       Induction: intravenous. MIPS: Postoperative opioids intended and Prophylactic antiemetics administered. Anesthetic plan and risks discussed with patient. Plan discussed with CRNA. This pre-anesthesia assessment may be used as a history and physical.    DOS STAFF ADDENDUM:    Pt seen and examined, chart reviewed (including anesthesia, drug and allergy history). No interval changes to history and physical examination.   Anesthetic plan, risks,

## 2020-02-24 NOTE — PROGRESS NOTES
Urology Progress Note    Subjective: I have some pain. HPI: Patient has been admitted for renal colic. P/E  BP (!) 142/94   Pulse 95   Temp 97.9 °F (36.6 °C) (Oral)   Resp 16   Ht 5' 1\" (1.549 m)   Wt 200 lb (90.7 kg)   SpO2 93%   BMI 37.79 kg/m²   Skin: Intact  Respiratory: Breathing without difficulty, stable. GI: No acute changes, stable po intake. : Mild left CVA tenderness. MSK: No acute changes, stable. Neuro: No acute changes, stable. Labs  Lab Results   Component Value Date    WBC 7.9 02/24/2020    HGB 13.7 02/24/2020    HCT 41.6 02/24/2020    MCV 86.6 02/24/2020     02/24/2020     Lab Results   Component Value Date     02/24/2020    K 4.8 02/24/2020    CL 99 02/24/2020    CO2 17 02/24/2020    BUN 13 02/24/2020    CREATININE 1.0 02/24/2020    CALCIUM 8.6 02/24/2020       Assessment/Plan  Overall stable. To have surgery today. She may require additional therapy such as ESWL.     Electronically signed by Geovanny Short MD on 2/24/2020 at 3:39 PM

## 2020-02-24 NOTE — H&P
Date End Date Taking? Authorizing Provider   tamsulosin (FLOMAX) 0.4 MG capsule Take 0.4 mg by mouth daily 2/19/20   Historical Provider, MD       Allergies:  Lactose intolerance (gi)    Social History:      The patient currently lives at home     TOBACCO:   reports that she has been smoking. She has a 10.00 pack-year smoking history. She has never used smokeless tobacco.  ETOH:   reports no history of alcohol use. E-Cigarettes Vaping or Juuling     Questions Responses    E-Cigarette Use Never User    Start Date     Does device contain nicotine? Quit Date     E-Cigarette Type             Family History:      Reviewed in detail positive as follows:        Problem Relation Age of Onset    Cancer Mother         Breast    No Known Problems Father     No Known Problems Sister     No Known Problems Brother     No Known Problems Maternal Aunt     No Known Problems Maternal Uncle     No Known Problems Paternal Aunt     No Known Problems Paternal Uncle     No Known Problems Maternal Grandmother     No Known Problems Maternal Grandfather     No Known Problems Paternal Grandmother     No Known Problems Paternal Grandfather     No Known Problems Other     Anesth Problems Neg Hx     Broken Bones Neg Hx     Clotting Disorder Neg Hx     Collagen Disease Neg Hx     Diabetes Neg Hx     Dislocations Neg Hx     Osteoporosis Neg Hx     Rheumatologic Disease Neg Hx     Scoliosis Neg Hx     Severe Sprains Neg Hx        REVIEW OF SYSTEMS:   Pertinent positives as noted in the HPI. All other systems reviewed and negative. PHYSICAL EXAM PERFORMED:    BP (!) 145/100   Pulse 104   Temp 97.8 °F (36.6 °C) (Oral)   Resp 16   Ht 5' 1\" (1.549 m)   Wt 200 lb (90.7 kg)   SpO2 95%   BMI 37.79 kg/m²     General appearance:  No apparent distress, appears stated age and cooperative. HEENT:  Normal cephalic, atraumatic without obvious deformity. Pupils equal, round, and reactive to light.   Extra ocular muscles intact. Conjunctivae/corneas clear. Neck: Supple, with full range of motion. No jugular venous distention. Trachea midline. Respiratory:  Normal respiratory effort. Clear to auscultation, bilaterally without Rales/Wheezes/Rhonchi. Cardiovascular:  Regular rate and rhythm with normal S1/S2 without murmurs, rubs or gallops. Abdomen: Soft, non-tender, non-distended with normal bowel sounds. Musculoskeletal:  No clubbing, cyanosis or edema bilaterally. Full range of motion without deformity. Skin: Skin color, texture, turgor normal.  No rashes or lesions. Neurologic:  Neurovascularly intact without any focal sensory/motor deficits. Cranial nerves: II-XII intact, grossly non-focal.  Psychiatric:  Alert and oriented, thought content appropriate, normal insight  Capillary Refill: Brisk,< 3 seconds   Peripheral Pulses: +2 palpable, equal bilaterally       Labs:     Recent Labs     02/23/20  1159   WBC 12.4*   HGB 14.9   HCT 45.2        Recent Labs     02/23/20  1159   *   K 4.0   CL 99   CO2 23   BUN 11   CREATININE 0.9   CALCIUM 9.2     Recent Labs     02/23/20  1159   AST 10*   ALT 8*   BILITOT <0.2   ALKPHOS 84     No results for input(s): INR in the last 72 hours. No results for input(s): Madeline Millin in the last 72 hours. Urinalysis:      Lab Results   Component Value Date    NITRU Negative 02/23/2020    WBCUA 0-2 02/23/2020    BACTERIA 1+ 02/23/2020    RBCUA 3-4 02/23/2020    BLOODU SMALL 02/23/2020    SPECGRAV 1.015 02/23/2020    GLUCOSEU Negative 02/23/2020       Radiology:     CT abd/pelvis - 7 mm and 4 mm calculi at the left UPJ with moderate left hydronephrosis. ASSESSMENT:    Active Hospital Problems    Diagnosis Date Noted    Kidney stone [N20.0] 02/23/2020    Nausea and vomiting [R11.2] 02/23/2020         PLAN:    1. Kidney stone - left ureteral stone with hydronephrosis. Pain control with IV Toradol prn and urology consult     2.  Nausea and vomiting - NPO, IV normal saline at 100 ml/hr. IV anti emetics prn ordered       DVT Prophylaxis: Lovenox   Diet: Diet NPO, After Midnight  DIET GENERAL;  Code Status: Full Code    PT/OT Eval Status: Not ordered     Dispo - Admit to Radha Piper MD    Thank you Mandi Dennis MD for the opportunity to be involved in this patient's care. If you have any questions or concerns please feel free to contact me at [phone].

## 2020-02-24 NOTE — PROGRESS NOTES
Pt resting. Resp e/e. Shift assessment completed and charted. No needs. Will monitor.  Jorden Rivera

## 2020-02-24 NOTE — ANESTHESIA POSTPROCEDURE EVALUATION
Department of Anesthesiology  Postprocedure Note    Patient: Alejandrina Hoffman  MRN: [id number]  [place]: 1970  Date of evaluation: 2/24/2020  Time:  4:40 PM     Procedure Summary     Date:  02/24/20 Room / Location:  [place] [address] / [facility]    Anesthesia Start:  0340 Anesthesia Stop:  0926    Procedure:  CYSTOSCOPY, URETEROSCOPY WITH  HOLMIUM LASER LITHOTRIPSY, STENT PLACEMENT, LEFT RETROGRADE, STONE EXTRACTION (Left Bladder) Diagnosis:  (STONE)    Surgeon:  Javan Lainez MD Responsible Provider:      Anesthesia Type:  general ASA Status:  2          Anesthesia Type: general    Kenna Phase I: Kenna Score: 7    Kenna Phase II:      Last vitals: Reviewed and per EMR flowsheets.    Vitals:    02/24/20 1552 02/24/20 1607 02/24/20 1621 02/24/20 1626   BP: 130/79 133/78 139/86 (!) 150/86   Pulse: 91 92 97 99   Resp: 16 20 21 16   Temp:       TempSrc:       SpO2: 93% 98% 93% 94%   Weight:       Height:           Anesthesia Post Evaluation    Patient location during evaluation: bedside  Patient participation: complete - patient participated  Level of consciousness: awake and alert  Airway patency: patent  Nausea & Vomiting: no nausea  Complications: no  Cardiovascular status: hemodynamically stable  Respiratory status: acceptable  Hydration status: euvolemic

## 2020-02-24 NOTE — PROGRESS NOTES
brought to bedside. Patient voided approximately 150 ml bloody urine. No clots noted.   Patient sipping soda

## 2020-02-26 NOTE — DISCHARGE SUMMARY
Physician Discharge Summary        Patient ID:  Naveen Lira  [id number]  60 y.o.  1970    Admit date: 2/23/2020    Discharge date and time: 2/24/2020  6:50 PM     Admitting Physician: Viktoria Christianson MD     Discharge Physician: Hudson Scott    Admission Diagnoses: Kidney stone [N20.0]    Discharge Diagnoses: Kidney stone [N20.0]    Discharged Condition: good      Hospital Course: Patient admitted for renal colic and a kidney stone. Stone management protocal was used and the patient is recovering. Management included surgical removal conservative management. Patient with successful voiding trial prior to discharge. Instructions for post op care and follow up were reviewed. Physical exam at the time of discharge was unremarkable, no post operative complications noted. Discharge Exam:   Physical exam at the time of discharge was unremarkable, no post operative complications noted. Disposition: home    Patient Instructions:      Medication List      START taking these medications    oxyCODONE-acetaminophen 5-325 MG per tablet  Commonly known as:  Percocet  Take 0.5 tablets by mouth every 4 hours as needed for Pain for up to 7 days. phenazopyridine 200 MG tablet  Commonly known as:  Pyridium  Take 1 tablet by mouth 3 times daily as needed for Pain     sulfamethoxazole-trimethoprim 400-80 MG per tablet  Commonly known as:   Bactrim  Take 1 tablet by mouth 2 times daily for 4 days        CONTINUE taking these medications    tamsulosin 0.4 MG capsule  Commonly known as:  FLOMAX        STOP taking these medications    ACTIVON ARTHRITIS ULTRA ST EX     gabapentin 100 MG capsule  Commonly known as:  NEURONTIN           Where to Get Your Medications      You can get these medications from any pharmacy    Bring a paper prescription for each of these medications  · oxyCODONE-acetaminophen 5-325 MG per tablet  · phenazopyridine 200 MG tablet  · sulfamethoxazole-trimethoprim 400-80 MG per tablet       Activity: no lifting, or Strenuous exercise for 1 week. Diet: regular diet    Follow-up with Dr. Michael Britton in 1-2 weeks.     Signed:  Stalin Brady  2/26/2020  7:08 AM

## 2020-02-28 LAB
CALCULI COMPOSITION: NORMAL
MASS: 62 MG
STONE DESCRIPTION: NORMAL
STONE NUMBER: 4
STONE SIZE: NORMAL MM

## 2021-02-22 ENCOUNTER — OFFICE VISIT (OUTPATIENT)
Dept: ORTHOPEDIC SURGERY | Age: 51
End: 2021-02-22
Payer: COMMERCIAL

## 2021-02-22 DIAGNOSIS — M79.641 RIGHT HAND PAIN: Primary | ICD-10-CM

## 2021-02-22 DIAGNOSIS — M79.602 LEFT ARM PAIN: ICD-10-CM

## 2021-02-22 DIAGNOSIS — S63.501A SPRAIN OF RIGHT WRIST, INITIAL ENCOUNTER: ICD-10-CM

## 2021-02-22 DIAGNOSIS — S50.12XA CONTUSION OF LEFT FOREARM, INITIAL ENCOUNTER: ICD-10-CM

## 2021-02-22 PROCEDURE — G8484 FLU IMMUNIZE NO ADMIN: HCPCS | Performed by: PHYSICIAN ASSISTANT

## 2021-02-22 PROCEDURE — 99213 OFFICE O/P EST LOW 20 MIN: CPT | Performed by: PHYSICIAN ASSISTANT

## 2021-02-22 PROCEDURE — L3908 WHO COCK-UP NONMOLDE PRE OTS: HCPCS | Performed by: PHYSICIAN ASSISTANT

## 2021-02-22 PROCEDURE — G8427 DOCREV CUR MEDS BY ELIG CLIN: HCPCS | Performed by: PHYSICIAN ASSISTANT

## 2021-02-22 PROCEDURE — 4004F PT TOBACCO SCREEN RCVD TLK: CPT | Performed by: PHYSICIAN ASSISTANT

## 2021-02-22 PROCEDURE — G8417 CALC BMI ABV UP PARAM F/U: HCPCS | Performed by: PHYSICIAN ASSISTANT

## 2021-02-22 NOTE — PROGRESS NOTES
CHOLECYSTECTOMY  01/07/2020    LAPAROSCOPIC CHOLECYSTECTOMY     CHOLECYSTECTOMY, LAPAROSCOPIC N/A 1/7/2020    LAPAROSCOPIC CHOLECYSTECTOMY performed by Андрей Costa MD at [address] / [address] / [place] Left 2/24/2020    CYSTOSCOPY, URETEROSCOPY WITH  HOLMIUM LASER LITHOTRIPSY, STENT PLACEMENT, LEFT RETROGRADE, STONE EXTRACTION performed by Charli Sunshine MD at [address] Bilateral 1/10/2019    BILATERAL LUMBAR THREE, LUMBAR FOUR, LUMBAR FIVE RADIOFREQUENCY ABLATION SITE CONFIRMED BY FLUOROSCOPY performed by Neymar Rojas MD at [address] HYSTEROSCOPY  09/27/2011    Dilation and Curetatge novasure ablation    OTHER SURGICAL HISTORY  2019    Stem cell injection to lower back     OTHER SURGICAL HISTORY  02/24/2020    cystoscopy, ureteroscopy with Holmium laser lithotripsy, stent placement, left retrograde    TONSILLECTOMY      WISDOM TOOTH EXTRACTION  1987       Social History     Occupational History    Not on file   Tobacco Use    Smoking status: Current Every Day Smoker     Packs/day: 0.50     Years: 20.00     Pack years: 10.00    Smokeless tobacco: Never Used   Substance and Sexual Activity    Alcohol use: No    Drug use: No    Sexual activity: Yes     Partners: Male       Current Outpatient Medications   Medication Sig Dispense Refill    tamsulosin (FLOMAX) 0.4 MG capsule Take 0.4 mg by mouth daily       No current facility-administered medications for this visit. Objective:     She is alert, oriented x 3, pleasant, well nourished, developed and in no   acute distress. There were no vitals taken for this visit. Right Wrist and Hand Exam:  There is no swelling. There is no skeletal deformity. There is minimal tenderness to palpation over the wrist.  Wrist range of motion is  limited by pain. Digital range of motion is not limited by pain and swelling.    FDS,FDP and Common Extensor tendon function is intact to each digit. FPL and EPL tendon function is intact to the thumb. Skin color, texture, turgor is normal.  No rashes or lesions found of the injured extremity. Vascular exam shows normal  And good capillary refill bilaterally. Sensation is subjectively normal in the whole hand. Digits are normally sensate. Left Wrist and Hand Exam:  There is no swelling. There is no skeletal deformity. There is mild tenderness to palpation over the mid ulna shaft. Wrist range of motion is not limited by pain. Digital range of motion is not limited by pain and swelling. FDS,FDP and Common Extensor tendon function is intact to each digit. FPL and EPL tendon function is intact to the thumb. Skin color, texture, turgor is normal.  No rashes or lesions found of the injured extremity. Vascular exam shows normal  And good capillary refill bilaterally. Sensation is subjectively normal in the whole hand. Digits are normally sensate. X Rays: performed in the office today:   AP, Lateral and Oblique of the Right Wrist:  Radiographs demonstrate normal bony alignment of the wrist. There is no radiographic evidence of a fracture or dislocation. AP and lateral left radius and ulna:  No acute displaced fractures. Additional Tests reviewed: None  Additional Outside Records reviewed: None    Diagnosis:       ICD-10-CM    1. Right hand pain  M79.641 XR HAND RIGHT (MIN 3 VIEWS)   2. Left arm pain  M79.602 XR RADIUS ULNA LEFT (2 VIEWS)        Assessment and Plan:       Assessment:  Acute right wrist sprain and acute left forearm contusion. 20 minutes was the total time spent on today's visit  including reviewing test results, obtaining or reviewing history, physical exam, time spent on documentation or ordering prescriptions, tests and procedures after the visit. Plan:  Medications- OTC NSAIDS discussed.    She  was advised that NSAID-type medications have two very important potential side effects: gastrointestinal irritation including hemorrhage and renal injuries. She was asked to take the medication with food and to stop if she experiences any GI upset. I asked her to call for vomiting, abdominal pain or black/bloody stools. She should have renal function testing per his medical provider periodically. The patient expresses understanding of these issues and questions were answered. Procedures-I have recommended immobilization of the right wrist sprain. Procedures    Jannet Hopper Titan Wrist Short Brace     Patient was prescribed a Jannet Hopper Titan Wrist Orthosis. The right wrist will require stabilization / immobilization from this semi-rigid / rigid orthosis to improve their function. The orthosis will assist in protecting the affected area, provide functional support and facilitate healing. The patient was educated and fit by a healthcare professional with expert knowledge and specialization in brace application while under the direct supervision of the treating physician. Verbal and written instructions for the use of and application of this item were provided. They were instructed to contact the office immediately should the brace result in increased pain, decreased sensation, increased swelling or worsening of the condition. Follow up- 10 days   Call or return to clinic if these symptoms worsen or fail to improve as anticipated.

## 2021-03-10 ENCOUNTER — OFFICE VISIT (OUTPATIENT)
Dept: ORTHOPEDIC SURGERY | Age: 51
End: 2021-03-10
Payer: COMMERCIAL

## 2021-03-10 VITALS — BODY MASS INDEX: 37.76 KG/M2 | HEIGHT: 61 IN | WEIGHT: 200 LBS

## 2021-03-10 DIAGNOSIS — S63.634A SPRAIN OF INTERPHALANGEAL JOINT OF RIGHT RING FINGER, INITIAL ENCOUNTER: ICD-10-CM

## 2021-03-10 DIAGNOSIS — S63.501A SPRAIN OF RIGHT WRIST, INITIAL ENCOUNTER: Primary | ICD-10-CM

## 2021-03-10 DIAGNOSIS — M25.531 RIGHT WRIST PAIN: ICD-10-CM

## 2021-03-10 PROCEDURE — G8417 CALC BMI ABV UP PARAM F/U: HCPCS | Performed by: FAMILY MEDICINE

## 2021-03-10 PROCEDURE — 4004F PT TOBACCO SCREEN RCVD TLK: CPT | Performed by: FAMILY MEDICINE

## 2021-03-10 PROCEDURE — G8484 FLU IMMUNIZE NO ADMIN: HCPCS | Performed by: FAMILY MEDICINE

## 2021-03-10 PROCEDURE — 99203 OFFICE O/P NEW LOW 30 MIN: CPT | Performed by: FAMILY MEDICINE

## 2021-03-10 PROCEDURE — G8427 DOCREV CUR MEDS BY ELIG CLIN: HCPCS | Performed by: FAMILY MEDICINE

## 2021-03-10 RX ORDER — METHYLPREDNISOLONE 4 MG/1
TABLET ORAL
Qty: 21 KIT | Refills: 0 | Status: SHIPPED | OUTPATIENT
Start: 2021-03-10 | End: 2021-06-09 | Stop reason: ALTCHOICE

## 2021-03-10 RX ORDER — DICLOFENAC SODIUM 75 MG/1
75 TABLET, DELAYED RELEASE ORAL 2 TIMES DAILY
Qty: 60 TABLET | Refills: 3 | Status: SHIPPED | OUTPATIENT
Start: 2021-03-10 | End: 2022-02-02

## 2021-03-10 NOTE — PROGRESS NOTES
Subjective:      Patient ID: Elmer Calix is a 53 y.o. female. Chief Complaint   Patient presents with    Wrist Pain     [facility] [employer] 2/22/21 RIGHT WRIST      Initial evaluation acute on chronic right ulnar wrist pain status post fall on ice 2/17/2021    HPI:   She is here in the [address]-   for an initial evaluation of a new problem. Right wrist pain and left forearm pain. Slipped on ice 2/17/2021 and struck forearm against ground and landed on right stretched wrist.  Pain Scale 5/10 VAS. Previous treatments have included Tylenol. 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 Ruddy Anne who is being seen today for follow-up from after-hours for evaluation of ongoing wrist hand fourth finger pain with stiffness. Her past orthopedic history is remarkable for hand consultation with Dr. 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 She was treated with a tendon sheath injection as well as hand therapy and use of a splint and her symptoms did improve but only lasted about 4 weeks or so. She did not follow-up with Dr. Bruno Velasquez and has been for the most part able to deal with the pain. Her fall on the ice on 2/17/2020 occurred as she was getting into the car after opening the door she slipped lost her balance and struck her hand against the door pillar she believes she sustained a load valgus injury to her fourth finger and did have some swelling over the PIP joint but also aggravated her ulnar dorsal hand as well as her chronic ulnar wrist pain. She may have had some mild swelling but no extensive ecchymosis.   She initially treated herself with some ibuprofen but was seen in after-hours on 2/22/2021 and was placed in a wrist splint once again encouraged to take anti-inflammatories. She does not regularly perform an exercise program and her symptoms may have improved about 40 to 50% but gripping grasping and typing is still painful. She is having more stiffness into the fourth finger x-rays are negative for fracture. She does have difficulty with gripping grasping and typing with pain range between a 3-6 out of 10. This does appear to be an acute on chronic phenomenon. She is being seen today for repeat evaluation and review of her previous plain films. Review Of Systems:   A 14 point review of systems and history form completed by the patient has been reviewed. This scanned in the media tab of the patient's chart under date of review of systems 2/22/2021. As noted in the HPI. Negative for fever or chills. Negative for joint pain, swelling and stiffness. Negative for numbness or tingling.      Past Medical History:   Diagnosis Date    Degeneration of lumbar or lumbosacral intervertebral disc 11/26/2018    Lateral epicondylitis of left elbow 10/9/2018    Left carpal tunnel syndrome 11/19/2018    Menorrhagia 9/2011       Family History   Problem Relation Age of Onset    Cancer Mother         Breast    No Known Problems Father     No Known Problems Sister     No Known Problems Brother     No Known Problems Maternal Aunt     No Known Problems Maternal Uncle     No Known Problems Paternal Aunt     No Known Problems Paternal Uncle     No Known Problems Maternal Grandmother     No Known Problems Maternal Grandfather     No Known Problems Paternal Grandmother     No Known Problems Paternal Grandfather     No Known Problems Other     Anesth Problems Neg Hx     Broken Bones Neg Hx     Clotting Disorder Neg Hx     Collagen Disease Neg Hx     Diabetes Neg Hx     Dislocations Neg Hx     Osteoporosis Neg Hx     Rheumatologic Disease Neg Hx     Scoliosis Neg Hx     Severe Sprains Neg Hx        Past Surgical History:   Procedure Laterality Date    CHOLECYSTECTOMY  01/07/2020    LAPAROSCOPIC CHOLECYSTECTOMY     CHOLECYSTECTOMY, LAPAROSCOPIC N/A 1/7/2020    LAPAROSCOPIC CHOLECYSTECTOMY performed by Joshua Washington MD at [address] / [address] / [place] Left 2/24/2020    CYSTOSCOPY, URETEROSCOPY WITH  HOLMIUM LASER LITHOTRIPSY, STENT PLACEMENT, LEFT RETROGRADE, STONE EXTRACTION performed by Yuliya Lafleur MD at [address] Bilateral 1/10/2019    BILATERAL LUMBAR THREE, LUMBAR FOUR, LUMBAR FIVE RADIOFREQUENCY ABLATION SITE CONFIRMED BY FLUOROSCOPY performed by Serenity Roth MD at [address] HYSTEROSCOPY  09/27/2011    Dilation and Curetatge novasure ablation    OTHER SURGICAL HISTORY  2019    Stem cell injection to lower back     OTHER SURGICAL HISTORY  02/24/2020    cystoscopy, ureteroscopy with Holmium laser lithotripsy, stent placement, left retrograde    TONSILLECTOMY      WISDOM TOOTH EXTRACTION  1987       Social History     Occupational History    Not on file   Tobacco Use    Smoking status: Current Every Day Smoker     Packs/day: 0.50     Years: 20.00     Pack years: 10.00    Smokeless tobacco: Never Used   Substance and Sexual Activity    Alcohol use: No    Drug use: No    Sexual activity: Yes     Partners: Male       Current Outpatient Medications   Medication Sig Dispense Refill    methylPREDNISolone (MEDROL DOSEPACK) 4 MG tablet Take by mouth as directed. 21 kit 0    diclofenac (VOLTAREN) 75 MG EC tablet Take 1 tablet by mouth 2 times daily 60 tablet 3     No current facility-administered medications for this visit. Objective:     She is alert, oriented x 3, pleasant, well nourished, developed and in no   acute distress. Ht 5' 1\" (1.549 m)   Wt 200 lb (90.7 kg)   BMI 37.79 kg/m²              Right Wrist and Hand Exam:  There is no swelling. There is no skeletal deformity.   There is moderate tenderness to palpation over the wrist primarily to the fourth and fifth CMC joints as well as the ECU tendon ulnarly involving the wrist..  Wrist range of motion is diminished about 25 to 30%. She is also somewhat tender over the PIP joint of the right hand fourth finger with stiffness in flexion but full extension noted. Tendon function is preserved. She does have pain reproduced with resisted ulnar deviation. No evidence of ECU tendon subluxation. Weakness about 4-5 in this range. Digital range of motion is not limited by pain and swelling. FDS,FDP and Common Extensor tendon function is intact to each digit. FPL and EPL tendon function is intact to the thumb. Skin color, texture, turgor is normal.  No rashes or lesions found of the injured extremity. Vascular exam shows normal  And good capillary refill bilaterally. Sensation is subjectively normal in the whole hand. Digits are normally sensate. Left Wrist and Hand Exam:  There is no swelling. There is no skeletal deformity. There is mild tenderness to palpation over the mid ulna shaft. Wrist range of motion is not limited by pain. Digital range of motion is not limited by pain and swelling. FDS,FDP and Common Extensor tendon function is intact to each digit. FPL and EPL tendon function is intact to the thumb. Skin color, texture, turgor is normal.  No rashes or lesions found of the injured extremity. Vascular exam shows normal  And good capillary refill bilaterally. Sensation is subjectively normal in the whole hand. Digits are normally sensate. X Rays: performed in the office from after-hours on 2/22/2021:   AP, Lateral and Oblique of the Right Wrist:  Radiographs demonstrate normal bony alignment of the wrist. There is no radiographic evidence of a fracture or dislocation. AP and lateral left radius and ulna:  No acute displaced fractures. Additional Tests reviewed: None  Additional Outside Records reviewed: None    Diagnosis:       ICD-10-CM    1. Sprain of right wrist, initial encounter  S63.501A methylPREDNISolone (MEDROL DOSEPACK) 4 MG tablet     diclofenac (VOLTAREN) 75 MG EC tablet     Ambulatory referral to Occupational Therapy   2. Sprain of interphalangeal joint of right ring finger, initial encounter  B11.783X methylPREDNISolone (MEDROL DOSEPACK) 4 MG tablet     diclofenac (VOLTAREN) 75 MG EC tablet     Ambulatory referral to Occupational Therapy   3. Right wrist pain  M25.531 methylPREDNISolone (MEDROL DOSEPACK) 4 MG tablet     diclofenac (VOLTAREN) 75 MG EC tablet     Ambulatory referral to Occupational Therapy        Assessment and Plan:       Assessment:  #1.  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 We did review her recent plain films and exam findings and previous records. She did have a fall on the ice about 3 weeks ago and her more acute symptoms have improved about 40 to 50% but she is still fairly sore when she is out of the splint. She also does have a history of likely chronic right wrist ECU spraining and did see Dr. Jevon Kamara for this back in 2019. She did not follow-up with him. She got about a 4 weeks of reasonable pain relief from her ECU tendon sheath injection. I would recommend to try to get her through her acute phase, that she see the hand therapist formally and we may try localized iontophoresis. We did place her on a Medrol Dosepak to be followed by diclofenac 75 mg 1 pill twice daily. Icing and activity modification importance performing her home exercise program was discussed. We will see her back in 4 weeks and consider updating her MRI with possibly a tendon sheath injection versus having her directly follow-up with Dr. Torrie Mak for hand consultation.   He has seen her in the past.  She will contact us in the interim with questions or concerns.

## 2021-03-17 ENCOUNTER — HOSPITAL ENCOUNTER (OUTPATIENT)
Dept: OCCUPATIONAL THERAPY | Age: 51
Setting detail: THERAPIES SERIES
Discharge: HOME | End: 2021-03-17
Payer: COMMERCIAL

## 2021-03-17 PROCEDURE — 97140 MANUAL THERAPY 1/> REGIONS: CPT | Performed by: OCCUPATIONAL THERAPIST

## 2021-03-17 PROCEDURE — 97165 OT EVAL LOW COMPLEX 30 MIN: CPT | Performed by: OCCUPATIONAL THERAPIST

## 2021-03-17 PROCEDURE — 97022 WHIRLPOOL THERAPY: CPT | Performed by: OCCUPATIONAL THERAPIST

## 2021-03-17 NOTE — PLAN OF CARE
[address] 989 [facility], [address] [place] [address] [address]  Phone: [phone] Fax: [phone]   Occupational Manish Gonzalez Certification  Dear Referring Practitioner: Dr. Shavon Shepherd    We had the pleasure of evaluating the following patient for occupational therapy services at [address] [place] [address]. A summary of our findings can be found in the initial assessment below. This includes our plan of care. If you have any questions or concerns regarding these findings, please do not hesitate to contact me at the office phone number checked above. Thank you for the referral.     Physician Signature:_______________________________Date:__________________  By signing above (or electronic signature), therapists plan is approved by physician      Patient: Merlin Ates   : 1970   MRN: [id number]  Referring Physician: Referring Practitioner: Dr. Shavon Shepherd      Evaluation Date: 3/17/2021      Medical Diagnosis Information:  Diagnosis: C65.880S (ICD-10-CM) - Sprain of right wrist, initial [id number] (ICD-10-CM) - Sprain of interphalangeal joint of right ring finger, initial smfglfgnzD37.531 (ICD-10-CM) - Right wrist pain                                             Insurance information:      Date of Injury: 21  Date of Surgery: none    Date of Patient follow up with Physician: 21    RESTRICTIONS/PRECAUTIONS: none    Latex Allergy:  [x]No      []Yes  Pacemaker:  [x] No       [] Yes     Preferred Language for Healthcare:   [x]English       []other:     Functional Scale: 50 % (Quick DASH)   Date assessed:  3/17/2021    C-SSRS Triggered by Intake questionnaire (Past 2 wk assessment):    No, Questionnaire did not trigger screening. SUBJECTIVE: Patient reported deficits/history of current problem:  Patient reports she has had pain in her right forearm since .    Had an MRI at that time that said she had a partial muscle tear(ECU tendon longitudinal tear per Dr. Anita Llanes note). Saw ortho in 2018 for same pain who gave her an injection and it got better for awhile. Alayna Linus on ice and injured right wrist and right ring finger , no fracture identified, given wrist brace and recommended NSAIDS at after hours ortho clinic. Saw ortho 3/10/21 due to continued stiffness and pain in right ring finger and pain in her wrist. Prescribed Medrol dose carlos and referred to therapy. Finished dose carlos but does not feel it helped at all. Wear brace at night because wake up from pain when she moves without brace. 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        Comorbidities Affecting Functional Performance:     []Anxiety (F41.9)/Depression (F32.9)   []Diabetes Type 1(E10.65) or 2 (E11.65)   []Rheumatoid Arthritis (M05.9)  []Fibromyalgia (M79.7)  []Neuropathy(G60.9)  []Osteoarthritis(M19.91)  []None   [x]Other: Right ECU tendonitis 8/19, Left lateal epicondylitis, left CTS, DJD lumbar spine    OBJECTIVE:   Date:  Hand Dominance:     [x]  Right    [] Left 3/17/2021     Objective Measures:    PAIN 10/10   Quick DASH 50 %   Digits tip to DPFC in cm WNL   Thumb ROM MWNL   Wrist ROM Ext/Flex R: 55/55 pain  L: 70/75   Rad/Uln dev ROM R: 15/30 pain  L:   Forearm ROM  Sup/pron R: 70/90 pain  L: 75/90    strength in lbs R: defer today  L:   Pinch Strengthin lbs: lat  R:  L:   Pinch Strength in lbs:  3 point R:  L:     Observations:  (including splints, bandages, incisions, scars): Nothing remarkable   Occupational Profile:  Home Enviroment: lives with  [x] spouse,  [] family,  [] alone,  [] significant other,   [] other:    Occupation/School: 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 Recreational Activities/Meaningful Interests: gardening, fishing and canoeing    Prior Level of Function: [x] Independent with ADLs/IADLs     [] Assistance needed (describe):    Patient-Identified Primary Performance Deficits (to be addressed in POC):   [] bathing    [x] household tasks (cooking/cleaning)   [] dressing    [] self feeding   [] grooming    [x] work/education   [] functional mobility   [] sleeping/rest   [] toileting/hygiene   [] recreational activities   [] driving    [] community/social participation   [] other:   Sensation:  [x] No reported deficits  [] Intact to light touch    [] [place] Mack test completed, findings as noted:  [] Other:    Palpation: significant soft tissue pathology in volar forearm muscles greater than dorsal    Functional Mobility/Transfers/Gait:  [x] Independent - no significant gait deviations  [] Assistance needed   [] Assistive device used: Falls Risk Assessment (30 days):   [] Falls Risk assessed and no intervention required. [] Falls Risk assessed and Patient requires intervention due to being higher risk   TUG score (>12s at risk):     [] Falls education provided, including      Review Of Systems (ROS): [x]Performed Review of systems (Integumentary, CardioPulmonary, Neurological) by intake and observation. Intake form has been scanned into medical record. Patient has been instructed to contact their primary care physician regarding ROS issues if not already being addressed at this time. ASSESSMENT:   This patient presents with signs and symptoms consistent with the medical diagnosis provided by the referring physician.      Impairments (physical, cognitive and/or psychosocial):  [x] Decreased mobility   [x] Weakness    [] Hypersensitivity   [x] Pain/tenderness   [] Edema/swelling   [] Decreased coordination (fine/gross motor) [] Impaired body mechanics  [] Sensory loss  [] Loss of balance   [] Other:      Performance Deficits (to be addressed in plan of care):   [] Bathing    [x] Household Tasks (cooking/cleaning)   [] Dressing    [] Self Feeding   [] Grooming    [x] Work/Education   [] Functional Mobility   [] Sleeping/Rest   [] Toileting/Hygiene   [] Recreational Activities   [] Driving    [] Community/Social Participation   [] Other:     Rehab Potential:   [] Excellent [x] Good [] Fair  [] Poor     Barriers affecting rehab potential:  []Age    []Lack of Motivation   []Co-Morbidities  []Cognitive Function  []Environmental/home/work barriers  []Other: Tolerance of evaluation/treatment:    [] Excellent [x] Good [] Fair  [] Poor    PLAN OF CARE:  Interventions:   [x] Therapeutic Exercise [x] Therapeutic Activity    [x] Activities of Daily Living [x] Neuromuscular Re-education      [x] Patient Education  [x] Manual Therapy      [x] Modalities as needed, and not otherwise contraindicated, including: ultrasound,paraffin,moist heat/cold pack, electrical stimulation, contrast bath, iontophoresis  [x] Splinting if needed    Frequency/Duration:  1-2 days per week for 4 weeks      GOALS:  Patient stated goal: less pain    [] Progressing: [] Met: [] Not Met: [] Adjusted    Therapist goals for Patient:   Short Term Goals: To be achieved in: 2 weeks  1. Independent in HEP and progression per patient tolerance, in order to prevent re-injury. [] Progressing: [] Met: [] Not Met: [] Adjusted   2. Patient will have a decrease in pain to facilitate improvement in movement, function, and ADLs as indicated by Functional Deficits. [] Progressing: [] Met: [] Not Met: [] Adjusted    Long Term Goals to be achieved in 30 weeks (through 4/15/21), including patient directed goals to address patient identified performance deficits:  1) Pt to be independent in graded HEP progression with a good level of effort and compliance.   [] Progressing: [] Met: [] Not Met: [] Adjusted   2) Pt to report a score of </= 30 % on the Quick DASH disability questionnaire for increased performance with carrying, moving, and handling objects. [] Progressing: [] Met: [] Not Met: [] Adjusted   3) Pt will demonstrate increased ROM to right wrist equal left for improved independence with performing household tasks involving reaching and pulling with right hand. [] Progressing: [] Met: [] Not Met: [] Adjusted   4) Pt will demonstrate increased strength to right  75% of left for improved independence with household tasks. [] Progressing: [] Met: [] Not Met: [] Adjusted   5) Pt will have a decrease in pain to 2/10 to facilitate use of right hand for normal activities with less pain. .  [] Progressing: [] Met: [] Not Met: [] Adjusted        OCCUPATIONAL THERAPY EVALUATION COMPLEXITY JUSTIFICATION:    [] An occupational profile and medical/therapy history, which includes:   [x] a brief history including medical and/or therapy records relating to the     presenting problem   [] an expanded review of medical and/or therapy records and additional review     of physical, cognitive or psychosocial history related to current functional    performance   [] an extensive additional review of review of medical and/or therapy records   and physical, cognitive, or psychosocial history related to current    functional performance    [] An assessment that identifies performance deficits (relating to physical, cognitive, or psychosocial skills) that result in activity limitations and/or participation restrictions:   [x] 1-3 performance deficits   [] 3-5 performance deficits   [] 5 or more performance deficits    [] Clinical decision making of:   [x] low complexity, including analysis of occupational profile, data analysis from problem focused assessment, and consideration of a limited number of treatment options. No comorbidities affect occupational performance.   No task modifications or assistance needed to complete evaluation. [] moderate complexity, including analysis of occupational profile, data analysis from detailed assessment and consideration of several treatment options. Comorbidities that affect occupational performance may be present. Minimal to moderate task modifications or assistance needed to complete assessment. [] high complexity, including analysis of occupational profile, analysis of data from comprehensive assessment and consideration of multiple treatment options. Multiple comorbidities present that affect occupational performance. Significant task modifications or assistance needed to complete assessment.     Evaluation Code:  [x] Low Complexity EVAL 30531 (typically 30 minutes face to face)  [] Mod Complexity EVAL 61966 (typically 45 minutes face to face)  [] High Complexity EVAL 28622 (typically 60 minutes face to face)    Electronically signed by:  Eugenia Gamble [place], [address] [place], [place] [id number]

## 2021-03-17 NOTE — FLOWSHEET NOTE
[address] [place], [address] [facility] [address]  Phone: [phone] Fax: [phone]   Occupational Therapy Treatment Note/ Progress Report:     Is this a Progress Report:     []  Yes  [x]  No      If Yes:  Date Range for reporting period:  Beginning 3/17/21  Ending 3/17/2021  Progress report will be due (10 Rx or 30 days whichever is less):     Recertification will be due (POC Duration  / 90 days whichever is less): 4/15/21   Date:  3/17/2021  Patient Name:  Starr Keen    :  1970  MRN: [id number]  Medical/Treatment Diagnosis Information:  · Diagnosis: S63.501A (ICD-10-CM) - Sprain of right wrist, initial [id number] (ICD-10-CM) - Sprain of interphalangeal joint of right ring finger, initial tcbvgildeI41.531 (ICD-10-CM) - Right wrist pain   ·       Insurance/Certification information:     Physician Information:  Referring Practitioner: Dr. Amirah Hendrix  Has the plan of care been signed (Y/N):        []  Yes  [x]  No     Visit # Insurance Allowable Auth Required   1  []  Yes []  No    From 3/17/21  to 3/17/2021    Date of Injury: 21  Date of Surgery: none     Date of Patient follow up with Physician: 21     RESTRICTIONS/PRECAUTIONS: none     Latex Allergy:  [x]? No      []? Yes                    Pacemaker:  [x]? No       []? Yes      Preferred Language for Healthcare:   [x]? English       []? other:      Functional Scale: 50 % (Quick DASH)                                Date assessed:  3/17/2021     C-SSRS Triggered by Intake questionnaire (Past 2 wk assessment):    No, Questionnaire did not trigger screening.      SUBJECTIVE: Patient reported deficits/history of current problem:  Patient reports she has had pain in her right forearm since . Had an MRI at that time that said she had a partial muscle tear(ECU tendon longitudinal tear per Dr. Amirah Hendrix note).   Saw ortho in 2018 for same pain who gave her an injection and it got better for awhile. Larissa Jimenez on ice and injured right wrist and right ring finger , no fracture identified, given wrist brace and recommended NSAIDS at after hours ortho clinic. Saw ortho 3/10/21 due to continued stiffness and pain in right ring finger and pain in her wrist. Prescribed Medrol dose carlos and referred to therapy. Finished dose carlos but does not feel it helped at all. Wear brace at night because wake up from pain when she moves without brace.     Pain Scale: 0/10 at rest and brace     [x]? Constant                [x]? 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 Patient reported history, allergies, and medications reviewed - see intake form.        Comorbidities Affecting Functional Performance:             []?Anxiety (F41.9)/Depression (F32.9)             []?Diabetes Type 1(E10.65) or 2 (E11.65)       []? Rheumatoid Arthritis (M05.9)  []? Fibromyalgia (M79.7)  []? Neuropathy(G60.9)  []? Osteoarthritis(M19.91)  []? None              [x]? Other: Right ECU tendonitis 8/19, Left lateal epicondylitis, left CTS, DJD lumbar spine     OBJECTIVE:   Date:  Hand Dominance:     [x]? Right    []?  Left 3/17/2021      Objective Measures:     PAIN 10/10   Quick DASH 50 %   Digits tip to DPFC in cm WNL   Thumb ROM MWNL   Wrist ROM Ext/Flex R: 55/55 pain  L: 70/75   Rad/Uln dev ROM R: 15/30 pain  L:   Forearm ROM  Sup/pron R: 70/90 pain  L: 75/90    strength in lbs R: defer today  L:   Pinch Strengthin lbs: lat  R:  L:   Pinch Strength in lbs:  3 point R:  L:      Observations:  (including splints, bandages, incisions,           MODALITIES: 3/17/21      Fluidotherapy (24591) 15'     Estim (95471/17932)      Paraffin (23459)      US (30366)      Iontophoresis (33104)      Hot Pack      Cold Pack            INTERVENTIONS:      Therapeutic Exercise (21883) Forearm stretching, AROM wrist and forearm all planes of motion                             Therapeutic Activity (00620)                  Manual Therapy (37026) DTM volar forearm     (IASTM, Dry Needling, manual mobilization)            Neuromuscular Reeducation (60413)                  ADL Training (13421)                  HEP Training/Review See sheet(s)                 Splinting      Lcode:      Orthotic Mgmt, Subsequent Enc (12727)      Orthotic Mgmt & Training (73980)            Other:        Therapeutic Exercise & NMR:  [] (29312) Provided verbal/tactile cueing for activities related to strengthening, flexibility, endurance, ROM  for improvements in scapular, scapulothoracic and UE control with self care, reaching, carrying, lifting, house/yardwork, driving/computer work.    [] (30507) Provided verbal/tactile cueing for activities related to improving balance, coordination, kinesthetic sense, posture, motor skill, proprioception  to assist with  scapular, scapulothoracic and UE control with self care, reaching, carrying, lifting, house/yardwork, driving/computer work.     Therapeutic Activities & NMR:    [] (86184 or 04233) Provided verbal/tactile cueing for activities related to improving balance, coordination, kinesthetic sense, posture, motor skill, proprioception and motor activation to allow for proper function of scapular, scapulothoracic and UE control with self care, carrying, lifting, driving/computer work    Home Exercise Program:    [] (17971) Reviewed/Progressed HEP activities related to strengthening, flexibility, endurance, ROM of scapular, scapulothoracic and UE control with self care, reaching, carrying, lifting, house/yardwork, driving/computer work  [] (57765) Reviewed/Progressed HEP activities related to improving balance, coordination, kinesthetic sense, posture, motor skill, proprioception of scapular, scapulothoracic and UE control with self care, reaching, carrying, lifting, house/yardwork, driving/computer work      Manual Treatments:  PROM / STM / Oscillations-Mobs:  G-I, II, III, IV (PA's, Inf., Post.)  [x] (70037) Provided manual therapy to mobilize soft tissue/joints of cervical/CT, scapular GHJ and UE for the purpose of modulating pain, promoting relaxation,  increasing ROM, reducing/eliminating soft tissue swelling/inflammation/restriction, improving soft tissue extensibility and allowing for proper ROM for normal function with self care, reaching, carrying, lifting, house/yardwork, driving/computer work    ADL Training:  [] (82139) Provided self-care/home management training related to activities of daily living and compensatory training, and/or use of adaptive equipment      Charges:  Timed Code Treatment Minutes: 15   Total Treatment Minutes: 45   Worker's Comp: Time In/Time Out     [x] EVAL (LOW) 50731 (typically 20 minutes face-to-face)    [] EVAL (MOD) 83838 (typically 30 minutes face-to-face)  [] EVAL (HIGH) 63733 (typically 45 minutes face-to-face)  [] OT Re-eval (12624)       [] Carmina [phone]) x  1    [] NZAKC(13297)  [] NMR (84931) x      [] Estim (attended) (22474)   [x] Manual ([id number]) x1      [] US (53610)  [] TA () x      [] Paraffin (65582)  [] ADL  ([phone]) x     [] Splint/L code:    [] Estim (unattended) [phone])  [x] Fluidotherapy (54389)  [] DN 1-2 (52588)   [] DN 3+ (53664)  [] Orthotic Mgmt, Subsequent Enc (65199)  [] Orthotic Mgmt & Training (62153)  [] Other:    ASSESSMENT:      GOALS: Patient stated goal: less pain    []? Progressing: []? Met: []? Not Met: []? Adjusted     Therapist goals for Patient:   Short Term Goals: To be achieved in: 2 weeks  1. Independent in HEP and progression per patient tolerance, in order to prevent re-injury. []? Progressing: []? Met: []? Not Met: []? Adjusted   2. Patient will have a decrease in pain to facilitate improvement in movement, function, and ADLs as indicated by Functional Deficits. []?  Progressing: []? Met: []? Not Met: []? Adjusted     Long Term Goals to be achieved in 30 weeks (through 4/15/21), including patient directed goals to address patient identified performance deficits:  1) Pt to be independent in graded HEP progression with a good level of effort and compliance. []? Progressing: []? Met: []? Not Met: []? Adjusted   2) Pt to report a score of </= 30 % on the Quick DASH disability questionnaire for increased performance with carrying, moving, and handling objects. []? Progressing: []? Met: []? Not Met: []? Adjusted   3) Pt will demonstrate increased ROM to right wrist equal left for improved independence with performing household tasks involving reaching and pulling with right hand. []? Progressing: []? Met: []? Not Met: []? Adjusted   4) Pt will demonstrate increased strength to right  75% of left for improved independence with household tasks. []? Progressing: []? Met: []? Not Met: []? Adjusted   5) Pt will have a decrease in pain to 2/10 to facilitate use of right hand for normal activities with less pain. .  []? Progressing: []? Met: []? Not Met: []? Adjusted          Overall Progression Towards Functional Goals/Treatment Progress Update:  [] Patient is progressing as expected towards functional goals listed. [] Progression is slowed due to complexities/impairments listed. [] Progression has been slowed due to co-morbidities.   [x] Plan just implemented, too soon to assess goals progression <30 days  [] Goals require adjustment due to lack of progress  [] Patient is not progressing as expected and requires additional follow up with physician  [] All goals are met  [] Other:     Prognosis for POC: [x] Good [] Fair  [] Poor    Patient requires continued skilled intervention: [x] Yes  [] No    Treatment/Activity Tolerance:  [x] Patient able to complete treatment  [] Patient limited by fatigue  [] Patient limited by pain    [] Patient limited by other medical complications  [] Other: PLAN: See eval  [] Continue per plan of care [] Alter current plan (see comments above)  [x] Plan of care initiated [] Hold pending MD visit [] Discharge    Electronically signed by:  Anu Gauthier OTR\L, T - [id number]       Note: If patient does not return for scheduled/ recommended follow up visits, this note will serve as a discharge from care along with most recent update on progress.

## 2021-03-24 ENCOUNTER — HOSPITAL ENCOUNTER (OUTPATIENT)
Dept: CT IMAGING | Age: 51
Discharge: HOME | End: 2021-03-24
Payer: COMMERCIAL

## 2021-03-24 DIAGNOSIS — R10.9 STOMACH ACHE: ICD-10-CM

## 2021-03-24 DIAGNOSIS — R31.29 MICROSCOPIC HEMATURIA: ICD-10-CM

## 2021-03-24 DIAGNOSIS — N20.2 CALCULUS OF KIDNEY AND URETER: ICD-10-CM

## 2021-03-24 PROCEDURE — 74176 CT ABD & PELVIS W/O CONTRAST: CPT

## 2021-03-25 ENCOUNTER — HOSPITAL ENCOUNTER (OUTPATIENT)
Dept: OCCUPATIONAL THERAPY | Age: 51
Setting detail: THERAPIES SERIES
Discharge: HOME | End: 2021-03-25
Payer: COMMERCIAL

## 2021-03-25 PROCEDURE — 97140 MANUAL THERAPY 1/> REGIONS: CPT | Performed by: OCCUPATIONAL THERAPIST

## 2021-03-25 PROCEDURE — 97022 WHIRLPOOL THERAPY: CPT | Performed by: OCCUPATIONAL THERAPIST

## 2021-03-25 PROCEDURE — 97110 THERAPEUTIC EXERCISES: CPT | Performed by: OCCUPATIONAL THERAPIST

## 2021-03-25 NOTE — FLOWSHEET NOTE
[address] [place], [address] [place] [address] [address]  Phone: [phone] Fax: [phone]   Occupational Therapy Treatment Note/ Progress Report:     Is this a Progress Report:     []  Yes  [x]  No      If Yes:  Date Range for reporting period:  Beginning 3/17/21  Ending 3/25/2021  Progress report will be due (10 Rx or 30 days whichever is less): 14    Recertification will be due (POC Duration  / 90 days whichever is less): 4/15/21   Date:  3/25/2021  Patient Name:  Abdulkadir De Santiago    :  1970  MRN: [id number]  Medical/Treatment Diagnosis Information:  · Diagnosis: S63.501A (ICD-10-CM) - Sprain of right wrist, initial zwloasvwiH39.634A (ICD-10-CM) - Sprain of interphalangeal joint of right ring finger, initial jgdilustrY18.531 (ICD-10-CM) - Right wrist pain   ·       Insurance/Certification information:     Physician Information:  Referring Practitioner: Dr. Wilmar Ventura  Has the plan of care been signed (Y/N):        []  Yes  [x]  No     Visit # Insurance Allowable Auth Required   2  []  Yes []  No    From 3/17/21  to 3/25/2021    Date of Injury: 21  Date of Surgery: none     Date of Patient follow up with Physician: 21     RESTRICTIONS/PRECAUTIONS: none     Latex Allergy:  [x]? No      []? Yes                    Pacemaker:  [x]? No       []? Yes      Preferred Language for Healthcare:   [x]? English       []? other:      Functional Scale: 50 % (Quick DASH)                                Date assessed:  3/17/2021     C-SSRS Triggered by Intake questionnaire (Past 2 wk assessment):    No, Questionnaire did not trigger screening.      SUBJECTIVE: Patient reports she fell down the steps on  and hit her right hand. Having soreness in ulnar side of her hand and stiffness in her ring finger. Patient reported deficits/history of current problem:  Patient reports she has had pain in her right forearm since . Had an MRI at that time that said she had a partial muscle tear(ECU tendon longitudinal tear per Dr. Casarez End note). Saw ortho in 2018 for same pain who gave her an injection and it got better for awhile. Karma Godfrey on ice and injured right wrist and right ring finger , no fracture identified, given wrist brace and recommended NSAIDS at after hours ortho clinic. Saw ortho 3/10/21 due to continued stiffness and pain in right ring finger and pain in her wrist. Prescribed Medrol dose carlos and referred to therapy. Finished dose carlos but does not feel it helped at all. Wear brace at night because wake up from pain when she moves without brace.     Pain Scale: 0/10 at rest and brace     [x]? Constant                [x]? 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 Patient reported history, allergies, and medications reviewed - see intake form.        Comorbidities Affecting Functional Performance:             []?Anxiety (F41.9)/Depression (F32.9)             []?Diabetes Type 1(E10.65) or 2 (E11.65)       []? Rheumatoid Arthritis (M05.9)  []? Fibromyalgia (M79.7)  []? Neuropathy(G60.9)  []? Osteoarthritis(M19.91)  []? None              [x]? Other: Right ECU tendonitis 8/19, Left lateal epicondylitis, left CTS, DJD lumbar spine     OBJECTIVE:   Date:  Hand Dominance:     [x]? Right    []?  Left 3/17/2021    3/25/21   Objective Measures:      PAIN 10/10    Quick DASH 50 %    Digits tip to DPFC in cm WNL    Thumb ROM MWNL    Wrist ROM Ext/Flex R: 55/55 pain  L: 70/75    Rad/Uln dev ROM R: 15/30 pain  L:    Forearm ROM  Sup/pron R: 70/90 pain  L: 75/90     strength in lbs R: defer today  L: 25 (feel pulling sensation in dorsal ulnar forearm)  68   Pinch Strengthin lbs: lat  R:  L: 10  19   Pinch Strength in lbs:  3 point R:  L:    9  14   Observations:  (including splints, bandages, incisions,            MODALITIES: 3/17/21  3/25/21     Fluidotherapy (21711) 15'      Estim (56890/31771)       Paraffin (01126)       US (51923)       Iontophoresis (63643)       Hot Pack       Cold Pack              INTERVENTIONS:       Therapeutic Exercise (74696) Forearm stretching, AROM wrist and forearm all planes of motion PROM right hand and wrist.    AROM to fingers in hook fist and roll into full fist x 10       Wrist flex/ext x 10                          Therapeutic Activity (72760)   strengthening with soft foam roll x 3'. Issued for home and instructed to perform daily suing pain and fatigue as guide                    Manual Therapy (41348) DTM volar forearm DTM with instruments volar and dorsal right forearm      (IASTM, Dry Needling, manual mobilization)              Neuromuscular Reeducation (75983)                     ADL Training (74713)                     HEP Training/Review See sheet(s)                    Splinting       Lcode:       Orthotic Mgmt, Subsequent Enc (42564)       Orthotic Mgmt & Training (30426)              Other:         Therapeutic Exercise & NMR:  [x] (39751) Provided verbal/tactile cueing for activities related to strengthening, flexibility, endurance, ROM  for improvements in scapular, scapulothoracic and UE control with self care, reaching, carrying, lifting, house/yardwork, driving/computer work.    [] (90908) Provided verbal/tactile cueing for activities related to improving balance, coordination, kinesthetic sense, posture, motor skill, proprioception  to assist with  scapular, scapulothoracic and UE control with self care, reaching, carrying, lifting, house/yardwork, driving/computer work.     Therapeutic Activities & NMR:    [] (34858 or 69837) Provided verbal/tactile cueing for activities related to improving balance, coordination, kinesthetic sense, posture, motor skill, proprioception and motor activation to allow for proper function of scapular, scapulothoracic implemented, too soon to assess goals progression <30 days  [] Goals require adjustment due to lack of progress  [] Patient is not progressing as expected and requires additional follow up with physician  [] All goals are met  [] Other:     Prognosis for POC: [x] Good [] Fair  [] Poor    Patient requires continued skilled intervention: [x] Yes  [] No    Treatment/Activity Tolerance:  [x] Patient able to complete treatment  [] Patient limited by fatigue  [] Patient limited by pain    [] Patient limited by other medical complications  [] Other:                  PLAN: See eval  [x] Continue per plan of care [] Alter current plan (see comments above)  [] Plan of care initiated [] Hold pending MD visit [] Discharge    Electronically signed by:  Gustabo Puga, OTR\L, [facility]       Note: If patient does not return for scheduled/ recommended follow up visits, this note will serve as a discharge from care along with most recent update on progress.

## 2021-03-31 ENCOUNTER — HOSPITAL ENCOUNTER (OUTPATIENT)
Dept: OCCUPATIONAL THERAPY | Age: 51
Setting detail: THERAPIES SERIES
Discharge: HOME | End: 2021-03-31
Payer: COMMERCIAL

## 2021-03-31 NOTE — FLOWSHEET NOTE
[address]  [address]  Phone: [phone] Fax: [phone]     Occupational Therapy  Cancellation/No-show Note  Patient Name:  hCerry Roy   :  1970   Date:  3/31/2021  Cancelled visits to date: 1  No-shows to date: 0    For today's appointment patient:  []    Cancelled  []    Rescheduled appointment  []    No-show     Reason given by patient:  []    Patient ill  []    Conflicting appointment  []    No transportation    [x]    Conflict with work  []    No reason given  []    Other:     Comments:      Electronically signed by:  Wilda Rodríguez, [address], [address] [facility] [address]

## 2021-04-05 ENCOUNTER — HOSPITAL ENCOUNTER (OUTPATIENT)
Dept: OCCUPATIONAL THERAPY | Age: 51
Setting detail: THERAPIES SERIES
Discharge: HOME | End: 2021-04-05

## 2021-04-05 NOTE — FLOWSHEET NOTE
[address]  [address]  Phone: [phone] Fax: [phone]     Occupational Therapy  Cancellation/No-show Note  Patient Name:  Garrett Cardoza   :  1970   Date:  2021  Cancelled visits to date: 1  No-shows to date: 1    For today's appointment patient:  []    Cancelled  []    Rescheduled appointment  [x]    No-show     Reason given by patient:  []    Patient ill  []    Conflicting appointment  []    No transportation    []    Conflict with work  []    No reason given  []    Other:     Comments:      Electronically signed by:  Wilda Moreau, [address], [address] [facility] [address]

## 2021-05-06 ENCOUNTER — TELEPHONE (OUTPATIENT)
Dept: ORTHOPEDIC SURGERY | Age: 51
End: 2021-05-06

## 2021-05-06 DIAGNOSIS — M25.531 RIGHT WRIST PAIN: ICD-10-CM

## 2021-05-06 DIAGNOSIS — S63.501A SPRAIN OF RIGHT WRIST, INITIAL ENCOUNTER: Primary | ICD-10-CM

## 2021-05-06 NOTE — TELEPHONE ENCOUNTER
PATIENT CALLED BACK AND STATED SHE IS STILL HAVING PAIN IN HER WRIST. SHE C/O NUMBNESS IN HER FINGER TIPS AND PAIN THAT IS WORSENING. STATES THERAPY MAKES IT WORSE. SHE IS NOT CURRENTLY TAKING HER NSAIDS OR USING HER BRACE WHILE AT WORK. ADVISED HER TO USE HER BRACE AND TO TAKE HER NSAIDS REGULARLY. SPOKE WITH DR. Ciera Navarro. WILL ORDER MRI AND EMG TO HAVE A DIFFERENTIAL DIAGNOSIS. WILL FOLLOW UP WITH PATIENT AFTER THESE DIAGNOSTIC TESTS TO DETERMINE PLAN OF CARE.

## 2021-05-10 ENCOUNTER — TELEPHONE (OUTPATIENT)
Dept: ORTHOPEDIC SURGERY | Age: 51
End: 2021-05-10

## 2021-05-10 NOTE — TELEPHONE ENCOUNTER
Left voicemail for patient that their MRI has been authorized and that they can call and schedule scan at their convenience. Also told them that they can call and schedule a f/u with Dr. Judge Batista once they have MRI scheduled, leaving at least 2-3 days for our office to receive their results.

## 2021-05-10 NOTE — TELEPHONE ENCOUNTER
M FOR PATIENT EXPLAINING THAT DR. VASQUEZ ORDERED AN MRI OF HER WRIST AS WELL AS AN EMG TO DETERMINE IF HER PAIN COULD BE COMING FROM SOMEWHERE ELSE. PATIENT CALLED TO ASK WHY  [address] HER WHOLE ARM. I EXPLAINED THAT THE MRI WOULD ADDRESS STRUCTURAL ISSUES POTENTIALLY WITH WRIST WHEREAS THE EMG WOULD ADDRESS NERVE CONDUCTIVITY TO DETERMINE IF ARM PAIN COULD BE COMING FROM HER NECK. TOLD HER TO GO AHEAD AND SCHEDULE BOTH AND TO CALL BACK WITH ANY OTHER QUESTIONS.

## 2021-06-09 ENCOUNTER — OFFICE VISIT (OUTPATIENT)
Dept: ORTHOPEDIC SURGERY | Age: 51
End: 2021-06-09
Payer: COMMERCIAL

## 2021-06-09 VITALS — HEIGHT: 61 IN | WEIGHT: 200 LBS | BODY MASS INDEX: 37.76 KG/M2

## 2021-06-09 DIAGNOSIS — S63.501A SPRAIN OF RIGHT WRIST, INITIAL ENCOUNTER: ICD-10-CM

## 2021-06-09 DIAGNOSIS — S69.81XA INJURY OF TRIANGULAR FIBROCARTILAGE COMPLEX (TFCC) OF RIGHT WRIST, INITIAL ENCOUNTER: ICD-10-CM

## 2021-06-09 DIAGNOSIS — M67.90 TENDINOPATHY: ICD-10-CM

## 2021-06-09 DIAGNOSIS — M25.531 RIGHT WRIST PAIN: Primary | ICD-10-CM

## 2021-06-09 PROCEDURE — 99213 OFFICE O/P EST LOW 20 MIN: CPT | Performed by: FAMILY MEDICINE

## 2021-06-09 PROCEDURE — G8427 DOCREV CUR MEDS BY ELIG CLIN: HCPCS | Performed by: FAMILY MEDICINE

## 2021-06-09 PROCEDURE — G8417 CALC BMI ABV UP PARAM F/U: HCPCS | Performed by: FAMILY MEDICINE

## 2021-06-09 PROCEDURE — 4004F PT TOBACCO SCREEN RCVD TLK: CPT | Performed by: FAMILY MEDICINE

## 2021-06-09 RX ORDER — ETODOLAC 400 MG/1
400 TABLET, FILM COATED ORAL 2 TIMES DAILY
Qty: 60 TABLET | Refills: 3 | Status: SHIPPED | OUTPATIENT
Start: 2021-06-09 | End: 2021-06-09

## 2021-06-09 RX ORDER — ETODOLAC 400 MG/1
400 TABLET, FILM COATED ORAL 2 TIMES DAILY
Qty: 60 TABLET | Refills: 3 | Status: SHIPPED | OUTPATIENT
Start: 2021-06-09 | End: 2022-02-02

## 2021-06-09 NOTE — PROGRESS NOTES
Subjective:      Patient ID: Alfred Greer is a 53 y.o. female. Chief Complaint   Patient presents with    Wrist Pain     CK RIGHT WRIST      Follow-up acute on chronic right ulnar wrist pain status post fall on ice 2/17/2021    HPI:   She is here in the [address]-   for an initial evaluation of a new problem. Right wrist pain and left forearm pain. Slipped on ice 2/17/2021 and struck forearm against ground and landed on right stretched wrist.  Pain Scale 5/10 VAS. Previous treatments have included Tylenol. 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 and does frequently type on the computer and is a very nice patient of Dr. Jorge Sheffield who is being seen today for follow-up from after-hours for evaluation of ongoing wrist hand fourth finger pain with stiffness. Her past orthopedic history is remarkable for hand consultation with Dr. 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 She was treated with a tendon sheath injection as well as hand therapy and use of a splint and her symptoms did improve but only lasted about 4 weeks or so. She did not follow-up with Dr. Tony Angel and has been for the most part able to deal with the pain. Her fall on the ice on 2/17/2020 occurred as she was getting into the car after opening the door she slipped lost her balance and struck her hand against the door pillar she believes she sustained a load valgus injury to her fourth finger and did have some swelling over the PIP joint but also aggravated her ulnar dorsal hand as well as her chronic ulnar wrist pain. She may have had some mild swelling but no extensive ecchymosis.   She initially treated herself with some ibuprofen but was seen in after-hours on 2/22/2021 and was placed in a wrist splint once again encouraged to take anti-inflammatories. She does not regularly perform an exercise program and her symptoms may have improved about 40 to 50% but gripping grasping and typing is still painful. She is having more stiffness into the fourth finger x-rays are negative for fracture. She does have difficulty with gripping grasping and typing with pain range between a 3-6 out of 10. This does appear to be an acute on chronic phenomenon. She is being seen today for repeat evaluation and review of her previous plain films. 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 We initially treat her with splinting and sent her over to hand therapy which has helped to some degree her fingers but her ulnar wrist pain is really not substantially improved. She only attended 2 sessions of hand therapy and has been episodically compliant with her home-based exercise program.  If she does any kind of frequent typing at work and then has to do frequent lifting or additional activities her pain can go up about a 6 out of 10. She was unable to tolerate the diclofenac as she had to stop this secondary to GI upset but and has been taking Advil episodically. Overall her symptoms primarily at the wrist have not substantially improved. She did contact us and we did send her for an MRI of her wrist which was obtained at [facility] on 5/11/2021 which did show which did show some marrow edema to the lunate consistent with likely repetitive microtrauma with some underlying chondromalacia. She did have chronic TFCC degeneration and chronic spraining to the ulnar attachments without evidence of new acute tear but she does remain painful in this area.   There is no evidence of acute osseous injury and did show evidence of mild ECU tendinopathy without jessica tearing. Compared to her previous study remotely the chondromalacia does seem to have to progressed with the new marrow edema. She has remain plateaued in her improvement particular the rest at about 40 to 50% since her fall on 2/17/2021. Review Of Systems:   A 14 point review of systems and history form completed by the patient has been reviewed. This scanned in the media tab of the patient's chart under date of review of systems 2/22/2021. As noted in the HPI. Negative for fever or chills. Negative for joint pain, swelling and stiffness. Negative for numbness or tingling.      Past Medical History:   Diagnosis Date    Degeneration of lumbar or lumbosacral intervertebral disc 11/26/2018    Lateral epicondylitis of left elbow 10/9/2018    Left carpal tunnel syndrome 11/19/2018    Menorrhagia 9/2011       Family History   Problem Relation Age of Onset    Cancer Mother         Breast    No Known Problems Father     No Known Problems Sister     No Known Problems Brother     No Known Problems Maternal Aunt     No Known Problems Maternal Uncle     No Known Problems Paternal Aunt     No Known Problems Paternal Uncle     No Known Problems Maternal Grandmother     No Known Problems Maternal Grandfather     No Known Problems Paternal Grandmother     No Known Problems Paternal Grandfather     No Known Problems Other     Anesth Problems Neg Hx     Broken Bones Neg Hx     Clotting Disorder Neg Hx     Collagen Disease Neg Hx     Diabetes Neg Hx     Dislocations Neg Hx     Osteoporosis Neg Hx     Rheumatologic Disease Neg Hx     Scoliosis Neg Hx     Severe Sprains Neg Hx        Past Surgical History:   Procedure Laterality Date    CHOLECYSTECTOMY  01/07/2020    LAPAROSCOPIC CHOLECYSTECTOMY     CHOLECYSTECTOMY, LAPAROSCOPIC N/A 1/7/2020    LAPAROSCOPIC CHOLECYSTECTOMY performed by Marjorie Rhoades MD at [address] / [place] / [place] Left 2/24/2020 CYSTOSCOPY, URETEROSCOPY WITH  HOLMIUM LASER LITHOTRIPSY, STENT PLACEMENT, LEFT RETROGRADE, STONE EXTRACTION performed by Betty Bernard MD at [address] Bilateral 1/10/2019    BILATERAL LUMBAR THREE, LUMBAR FOUR, LUMBAR FIVE RADIOFREQUENCY ABLATION SITE CONFIRMED BY FLUOROSCOPY performed by Leyla Wilkerson MD at [address] HYSTEROSCOPY  09/27/2011    Dilation and Curetatge novasure ablation    OTHER SURGICAL HISTORY  2019    Stem cell injection to lower back     OTHER SURGICAL HISTORY  02/24/2020    cystoscopy, ureteroscopy with Holmium laser lithotripsy, stent placement, left retrograde    TONSILLECTOMY      WISDOM TOOTH EXTRACTION  1987       Social History     Occupational History    Not on file   Tobacco Use    Smoking status: Current Every Day Smoker     Packs/day: 0.50     Years: 20.00     Pack years: 10.00    Smokeless tobacco: Never Used   Vaping Use    Vaping Use: Never used   Substance and Sexual Activity    Alcohol use: No    Drug use: No    Sexual activity: Yes     Partners: Male       Current Outpatient Medications   Medication Sig Dispense Refill    etodolac (LODINE) 400 MG tablet Take 1 tablet by mouth 2 times daily 60 tablet 3    diclofenac (VOLTAREN) 75 MG EC tablet Take 1 tablet by mouth 2 times daily (Patient not taking: Reported on 6/9/2021) 60 tablet 3     No current facility-administered medications for this visit. Objective:     She is alert, oriented x 3, pleasant, well nourished, developed and in no   acute distress. Ht 5' 1\" (1.549 m)   Wt 200 lb (90.7 kg)   BMI 37.79 kg/m²              Right Wrist and Hand Exam:  There is no swelling. There is no skeletal deformity. There is no more mild tenderness to palpation to the fourth and fifth CMC joints with moderate tenderness over the ECU tendon ulnarly involving the wrist and over the ulnar attachment of the TFCC. ..   Wrist range of motion is diminished about 25 to normal.    Right wrist MRI obtained at [facility] 2021 as listed above  Narrative   Site: [employer] [place] #: [id number] #: [id number] Procedure: MR Right Wrist joint w/o Contrast ; Reason for Exam: Sprain of right wrist, initial encounter; Right wrist pain   This document is confidential medical information.  Unauthorized disclosure or use of this information is prohibited by law. If you are not the intended recipient of this document, please advise us by calling immediately [phone].       [facility] [address]           Patient Name: Michael Hull   Case ID: [id number]   Patient : 1970   Referring Physician: Rohan Fulton MD   Exam Date: 2021   Exam Description: MR Right Wrist joint w/o Contrast            HISTORY:  Sprain of right wrist, initial encounter; right wrist pain.       TECHNICAL FACTORS:  Long- and short-axis fat- and water-weighted images were performed.       COMPARISON:  Prior right wrist MRI May 16, 2009.       FINDINGS:  Triangular fibrocartilage and its ulnar attachments are intact.       Scapholunate ligament and lunotriquetral ligament are intact.       Dorsal swelling.  Sprain and capsulitis present.       Subchondral marrow changes within the proximal ulnar aspect of the lunate.  Findings typical of    prior trauma or repetitive microtrauma.  Overlying chondromalacia present.  This is better    visualized on prior study, although similar.  It has slightly progressed.       No flexor or extensor tendon tear demonstrated.  Mild tendinopathy involves the ECU.  Signal    changes within the ECU as it courses subjacent to the ulnar styloid largely secondary to magic    angle artifact.       CONCLUSION:   1. Subchondral marrow changes within the proximal ulnar aspect of lunate typical of prior    trauma and/or repetitive microtrauma.  Subchondral sclerosis present.  Overlying chondromalacia    present.    2. 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  No acute tear. 3. No osseous fracture, AVN or mass. 4. Mild ECU tendinopathy.  No tear or tenosynovitis. 5. Overall findings similar to prior study.  Marrow changes and chondromalacia within the    proximal lunate have increased relative to prior study.       Thank you for the opportunity to provide your interpretation.               Jimenez Hunt MD       A: ANTONIA/all 05/12/2021 1:08 PM   T: ALL 05/12/2021 12:44 PM           Right upper extremity EMG/nerve conduction studies performed by Dr. Kimball Pass 5/14/2021 were within normal limits without evidence of cubital/carpal tunnel neuropathy or radiculopathy      Additional Tests reviewed: None  Additional Outside Records reviewed: None    Diagnosis:       ICD-10-CM    1. Right wrist pain  M25.531 etodolac (LODINE) 400 MG tablet   2. Sprain of right wrist, initial encounter  S63.501A etodolac (LODINE) 400 MG tablet        Assessment and Plan:       Assessment:  #1.  3.5 months status post fall with improving left wrist and fourth finger sprain with ongoing wrist pain and history of chronic MRI documented ECU tendinopathy with chronic TFCC sprain and ongoing wrist pain with mild wrist arthropathy      Plan: Treatment options were discussed with Bryan Marsh today. We did review her recent plain films, recent right wrist MRI and EMG findings and exam findings and previous records. She did have a fall on the ice about on 2/17/2021 and her more acute symptoms involving her fingers have improved but has had persistence of primarily her ulnar wrist pain. We did review her MRI which did show ECU tendinopathy with chronic TFCC spraining without evidence of acute tear. She also did have some mild wrist arthropathy and has not substantially improved with use of her splint and was unable to tolerate her diclofenac.   She only attended 2 sessions of hand therapy but with active use particularly with typing which she does frequently work her symptoms are quite painful. Her EMG fortunately was negative. I would like for her to see Dr. Carina Reyes formally as she did evaluate her back in 2019 and she may need a TFCC debridement. Icing and activity modification was discussed. We did change her to Lodine 400 mg 1 pill twice daily and may utilize her splint with heavier activities and work on her home-based exercise program.  She will contact us in the interim with questions or concerns.

## 2021-09-10 ENCOUNTER — HOSPITAL ENCOUNTER (OUTPATIENT)
Age: 51
Discharge: HOME | End: 2021-09-10
Payer: COMMERCIAL

## 2021-09-10 PROCEDURE — U0003 INFECTIOUS AGENT DETECTION BY NUCLEIC ACID (DNA OR RNA); SEVERE ACUTE RESPIRATORY SYNDROME CORONAVIRUS 2 (SARS-COV-2) (CORONAVIRUS DISEASE [COVID-19]), AMPLIFIED PROBE TECHNIQUE, MAKING USE OF HIGH THROUGHPUT TECHNOLOGIES AS DESCRIBED BY CMS-2020-01-R: HCPCS

## 2021-09-10 PROCEDURE — U0005 INFEC AGEN DETEC AMPLI PROBE: HCPCS

## 2021-09-11 LAB — SARS-COV-2: NOT DETECTED

## 2021-11-09 ENCOUNTER — HOSPITAL ENCOUNTER (OUTPATIENT)
Dept: MAMMOGRAPHY | Age: 51
Discharge: HOME | End: 2021-11-09
Payer: COMMERCIAL

## 2021-11-09 DIAGNOSIS — Z12.31 ENCOUNTER FOR SCREENING MAMMOGRAM FOR BREAST CANCER: ICD-10-CM

## 2021-11-09 PROCEDURE — 77063 BREAST TOMOSYNTHESIS BI: CPT

## 2021-11-10 ENCOUNTER — TELEPHONE (OUTPATIENT)
Dept: MAMMOGRAPHY | Age: 51
End: 2021-11-10

## 2022-01-24 ENCOUNTER — OFFICE VISIT (OUTPATIENT)
Dept: ORTHOPEDIC SURGERY | Age: 52
End: 2022-01-24
Payer: COMMERCIAL

## 2022-01-24 VITALS — HEIGHT: 61 IN | WEIGHT: 183 LBS | BODY MASS INDEX: 34.55 KG/M2 | RESPIRATION RATE: 16 BRPM

## 2022-01-24 DIAGNOSIS — M25.531 RIGHT WRIST PAIN: Primary | ICD-10-CM

## 2022-01-24 DIAGNOSIS — S63.501A SPRAIN OF RIGHT WRIST, INITIAL ENCOUNTER: ICD-10-CM

## 2022-01-24 PROCEDURE — G8484 FLU IMMUNIZE NO ADMIN: HCPCS | Performed by: PHYSICIAN ASSISTANT

## 2022-01-24 PROCEDURE — 99214 OFFICE O/P EST MOD 30 MIN: CPT | Performed by: PHYSICIAN ASSISTANT

## 2022-01-24 PROCEDURE — 4004F PT TOBACCO SCREEN RCVD TLK: CPT | Performed by: PHYSICIAN ASSISTANT

## 2022-01-24 PROCEDURE — G8417 CALC BMI ABV UP PARAM F/U: HCPCS | Performed by: PHYSICIAN ASSISTANT

## 2022-01-24 PROCEDURE — 3017F COLORECTAL CA SCREEN DOC REV: CPT | Performed by: PHYSICIAN ASSISTANT

## 2022-01-24 PROCEDURE — G8428 CUR MEDS NOT DOCUMENT: HCPCS | Performed by: PHYSICIAN ASSISTANT

## 2022-01-24 RX ORDER — ATORVASTATIN CALCIUM 40 MG/1
40 TABLET, FILM COATED ORAL DAILY
COMMUNITY

## 2022-01-24 NOTE — PROGRESS NOTES
Chief Complaint    Wrist Pain (Right)      History of Present Illness:  Peg Ayala is a 49 y.o. female presents to the office today with a new problem. Patient here with a chief complaint of right wrist pain. Patient's right wrist became painful approximately 2 weeks ago. She fell on an outstretched arm. Pain is more concentrated over the ulnar aspect of the wrist.  She has had previous surgery by Dr. Tyra Godwin. She denies neurologic symptoms. She has been using a brace and ibuprofen. Pain Assessment  Location of Pain: Wrist  Location Modifiers: Right,Medial  Severity of Pain: 8  Quality of Pain: Sharp,Dull,Aching  Duration of Pain: Persistent  Frequency of Pain: Constant  Date Pain First Started: 01/10/22  Aggravating Factors: Bending,Stretching,Straightening  Limiting Behavior: Yes  Result of Injury: Yes  Work-Related Injury: No  Are there other pain locations you wish to document?: No    Medical History:  Patient's medications, allergies, past medical, surgical, social and family histories were reviewed and updated as appropriate. Vital Signs:  Resp 16   Ht 5' 1\" (1.549 m)   Wt 183 lb (83 kg)   BMI 34.58 kg/m²         Wrist Examination:    Inspection: Swelling concentrated extensor ulnar aspect of wrist    Palpation: No tenderness over distal radius or anatomical snuffbox. No significant tenderness over the metacarpal or carpal bones. Tenderness over the TFCC. Mild tenderness over the ulnar styloid. Range of Motion: Full but painful range    Strength: 5/5  and pinch    Special Tests: Negative scaphoid shift    Skin: There are no rashes, ulcerations or lesions. Gait: Normal    Reflex +2    Additional Comments:       Additional Examinations:         Left Upper Extremity: Examination of the left upper extremity does not show any tenderness, deformity or injury. Range of motion is unremarkable. There is no gross instability. There are no rashes, ulcerations or lesions.   Strength and tone are normal.    Radiology:     X-rays obtained and reviewed in office:  Views AP, lateral, oblique, and scaphoid views  Location right wrist  Impression no evidence of fractures or dislocations. Well-maintained joint space. Assessment : Right wrist sprain-TFCC    Impression:  Encounter Diagnoses   Name Primary?  Right wrist pain Yes    Sprain of right wrist, initial encounter        Office Procedures:  Orders Placed This Encounter   Procedures    XR WRIST RIGHT (MIN 3 VIEWS)     Standing Status:   Future     Number of Occurrences:   1     Standing Expiration Date:   2/24/2022       Treatment Plan: Patient will use the brace that she already has at home. Standing full-time. 800 mg of ibuprofen 3 times a day. I will have her follow-up with Dr. Maycol Milligan in 7-10 days for repeat clinical exam.  If still symptomatic patient may benefit from an MRI to fully evaluate the TFCC or cortisone injection. I discussed with Irene Jade that her history, symptoms, signs and imaging are most consistent with wrist sprain. We reviewed the natural history of these conditions and treatment options ranging from conservative measures (rest, icing, activity modification, physical therapy, pain meds, cortisone injection) to surgical options. In terms of treatment, I recommended continuing with rest, icing, avoidance of painful activities, NSAIDs or pain meds as tolerated, and physical therapy. If these are not effective, cortisone injection can be considered. We discussed surgical options as well, should conservative measures fail.

## 2022-02-02 ENCOUNTER — OFFICE VISIT (OUTPATIENT)
Dept: ORTHOPEDIC SURGERY | Age: 52
End: 2022-02-02
Payer: COMMERCIAL

## 2022-02-02 VITALS — HEIGHT: 61 IN | BODY MASS INDEX: 35.42 KG/M2 | WEIGHT: 187.6 LBS | RESPIRATION RATE: 16 BRPM

## 2022-02-02 DIAGNOSIS — S63.501A SPRAIN OF RIGHT WRIST, INITIAL ENCOUNTER: Primary | ICD-10-CM

## 2022-02-02 PROCEDURE — G8484 FLU IMMUNIZE NO ADMIN: HCPCS | Performed by: ORTHOPAEDIC SURGERY

## 2022-02-02 PROCEDURE — 3017F COLORECTAL CA SCREEN DOC REV: CPT | Performed by: ORTHOPAEDIC SURGERY

## 2022-02-02 PROCEDURE — G8427 DOCREV CUR MEDS BY ELIG CLIN: HCPCS | Performed by: ORTHOPAEDIC SURGERY

## 2022-02-02 PROCEDURE — 99213 OFFICE O/P EST LOW 20 MIN: CPT | Performed by: ORTHOPAEDIC SURGERY

## 2022-02-02 PROCEDURE — G8417 CALC BMI ABV UP PARAM F/U: HCPCS | Performed by: ORTHOPAEDIC SURGERY

## 2022-02-02 PROCEDURE — 4004F PT TOBACCO SCREEN RCVD TLK: CPT | Performed by: ORTHOPAEDIC SURGERY

## 2022-02-02 NOTE — PROGRESS NOTES
CHIEF COMPLAINT: Right wrist pain    History:   Ms. Rolo Elam 49 y.o. right handed female presents today for the first visit for evaluation of a right wrist pain / injury. The patient was referred by GEORGINA Trent from [address] [place] [address]. This is evaluated as a personal injury. The pain began 3 weeks ago. She rates pain at 6/10. There was a history of injury. Her dog's leash got wrapped around her causing her to fall. She tried to brace the fall on with her outstretched right wrist.   Pain is located along the ulnar aspect of her wrist.  She notes some mild swelling along her wrist.  Symptoms are worse with pronation/supination, lifting things, pushing. She denies any numbness and tingling in the fingers. Advil does help with her symptoms. She has tried ice. 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 Of note, she did have a right wrist arthroscopy with TFCC and articular cartilage debridement by Dr. Miriam Blackwell in September 2021. Outside reports reviewed: [address] [place] [address] note, Dr. Kareem Mendenhall office note.     Past Medical History:   Diagnosis Date    Degeneration of lumbar or lumbosacral intervertebral disc 11/26/2018    Lateral epicondylitis of left elbow 10/9/2018    Left carpal tunnel syndrome 11/19/2018    Menorrhagia 9/2011       Past Surgical History:   Procedure Laterality Date    BREAST BIOPSY      CHOLECYSTECTOMY  01/07/2020    LAPAROSCOPIC CHOLECYSTECTOMY     CHOLECYSTECTOMY, LAPAROSCOPIC N/A 1/7/2020    LAPAROSCOPIC CHOLECYSTECTOMY performed by Odilon Weber MD at [address] [facility] 2/[facility]    CYSTOSCOPY, URETEROSCOPY WITH  HOLMIUM LASER LITHOTRIPSY, STENT PLACEMENT, LEFT RETROGRADE, STONE EXTRACTION performed by Elma Lazaro MD at [address] Bilateral 1/10/2019    BILATERAL LUMBAR THREE, LUMBAR FOUR, LUMBAR FIVE RADIOFREQUENCY ABLATION SITE CONFIRMED BY FLUOROSCOPY performed by Aryan Baltazar MD at [place] [address]  HYSTEROSCOPY  09/27/2011    Dilation and Curetatge novasure ablation    OTHER SURGICAL HISTORY  2019    Stem cell injection to lower back     OTHER SURGICAL HISTORY  02/24/2020    cystoscopy, ureteroscopy with Holmium laser lithotripsy, stent placement, left retrograde    TONSILLECTOMY      WISDOM TOOTH EXTRACTION  1987    WRIST SURGERY Right 09/13/2021    Dr. Wyvonna Kussmaul       Current Outpatient Medications on File Prior to Visit   Medication Sig Dispense Refill    Probiotic Product (PROBIOTIC PO) Take by mouth      metFORMIN (GLUCOPHAGE) 500 MG tablet Take 500 mg by mouth 3 times daily      atorvastatin (LIPITOR) 40 MG tablet Take 40 mg by mouth daily       No current facility-administered medications on file prior to visit. 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   Tobacco Use    Smoking status: Current Every Day Smoker     Packs/day: 0.50     Years: 20.00     Pack years: 10.00     Types: Cigarettes     Start date: 2002    Smokeless tobacco: Never Used   Vaping Use    Vaping Use: Never used   Substance and Sexual Activity    Alcohol use: No    Drug use: No    Sexual activity: Yes     Partners: Male   Other Topics Concern    Not on file   Social History Narrative    Not on file     Social Determinants of Health     Financial Resource Strain:     Difficulty of Paying Living Expenses: Not on file   Food Insecurity:     Worried About [address] in the Last Year: Not on file    [address] in the Last Year: Not on file   Transportation Needs:     Lack of Transportation (Medical):  Not on file    Lack of Transportation (Non-Medical): Not on file   Physical Activity:     Days of Exercise per Week: Not on file    Minutes of Exercise per Session: Not on file   Stress:     Feeling of Stress : Not on file   Social Connections:     Frequency of Communication with Friends and Family: Not on file    Frequency of Social Gatherings with Friends and Family: Not on file    Attends Scientology Services: Not on file    Active Member of Clubs or Organizations: Not on file    Attends Club or Organization Meetings: Not on file    Marital Status: Not on file   Intimate Partner Violence:     Fear of Current or Ex-Partner: Not on file    Emotionally Abused: Not on file    Physically Abused: Not on file    Sexually Abused: Not on file   Housing Stability:     Unable to Pay for Housing in the Last Year: Not on file    Number of Places Lived in the Last Year: Not on file    Unstable Housing in the Last Year: Not on file       Family History   Problem Relation Age of Onset    Cancer Mother         Breast    No Known Problems Father     No Known Problems Sister     No Known Problems Brother     No Known Problems Maternal Aunt     No Known Problems Maternal Uncle     No Known Problems Paternal Aunt     No Known Problems Paternal Uncle     No Known Problems Maternal Grandmother     No Known Problems Maternal Grandfather     No Known Problems Paternal Grandmother     No Known Problems Paternal Grandfather     No Known Problems Other     Anesth Problems Neg Hx     Broken Bones Neg Hx     Clotting Disorder Neg Hx     Collagen Disease Neg Hx     Diabetes Neg Hx     Dislocations Neg Hx     Osteoporosis Neg Hx     Rheumatologic Disease Neg Hx     Scoliosis Neg Hx     Severe Sprains Neg Hx        Review of Systems:  I have reviewed the clinically relevant past medical history, medications, allergies, family history, social history, and 13 point Review of Systems from the patient's recent history form & documented any details relevant to today's presenting complaints in the history above. The patient's self-reported past medical history, medications, allergies, family history, social history, and Review of Systems form from 1/24/22 have been scanned into the chart under the \"Media\" tab. Physical Examination:     Ms. Judge Garcia is a pleasant 49 y.o. female who presents today in no acute distress, awake, alert, and oriented. Resp 16   Ht 5' 1\" (1.549 m)   Wt 187 lb 9.6 oz (85.1 kg)   BMI 35.45 kg/m²      There is minimal swelling that can be seen along the ulnar aspect of the wrist, no change in the color. She has intact sensation to light touch throughout the bilateral hands in the median, radial, and ulnar nerve distribution and good radial pulses. EPL/FPL/Interossei are intact bilaterally. She has slightly decreased wrist flexion on the right compared to the left. She has symmetric dorsiflexion bilaterally. She is  tender at the distal aspect of the ulna, ECU tendon, and at the TFCC. She has some pain with ulnar deviation of her wrist.      IMAGING:  Right wrist xray's 1/24/2022: Reviewed. No fracture or dislocation. MRI of right wrist 5/12/2021:  1. Subchondral marrow changes within the proximal ulnar aspect of lunate typical of prior    trauma and/or repetitive microtrauma.  Subchondral sclerosis present.  Overlying chondromalacia    present. 2. 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  No acute tear. 3. No osseous fracture, AVN or mass. 4. Mild ECU tendinopathy.  No tear or tenosynovitis. 5. Overall findings similar to prior study.  Marrow changes and chondromalacia within the    proximal lunate have increased relative to prior study. Assessment:     Right wrist contusion / sprain  Right ECU tendinopathy  Right TFCC degeneration  Diabetes    Plan:     Natural history and expected course discussed. Questions answered.   Based on some of her symptoms, I think this is consistent with her history of fall. I think this is consistent with a contusion/bone bruise, mostly at her ulna. Otherwise may have her symptoms are consistent with her prior history of ECU tendinopathy and TFCC degeneration. She did have prior TFCC debridement already by Dr. Elsa Robles. There could be reinjury to the TFCC. However, this would likely still be degenerative in nature. I would recommend continued nonoperative management at this time. Continue ice as needed. Continue NSAIDs as needed. We did discuss trying a Medrol Dosepak. She states that she does have history of diabetes, with A1c sometimes as high as 9. More recently it was below 7. She is hesitant to start the steroids at this time. We will consider that as the next option. Continue brace as needed. If this does not seem to be helping she can discontinue this. Can consider following up again with Dr. Elsa Robles if symptoms fail to improve over the next 6-8 weeks. Wilbert Man. Nette Wright MD  Orthopaedic Surgery and Sports Medicine     Disclaimer: This note was generated with use of a verbal recognition program and an attempt was made to check for errors. It is possible that there are still dictated errors within this office note. If so, please bring any significant errors to my attention for an addendum. All efforts were made to ensure that this office note is accurate.

## 2022-06-10 ENCOUNTER — OFFICE VISIT (OUTPATIENT)
Dept: ORTHOPEDIC SURGERY | Age: 52
End: 2022-06-10

## 2022-06-10 VITALS — BODY MASS INDEX: 35.3 KG/M2 | HEIGHT: 61 IN | WEIGHT: 187 LBS

## 2022-06-10 DIAGNOSIS — M79.671 RIGHT FOOT PAIN: Primary | ICD-10-CM

## 2022-06-10 PROCEDURE — 99213 OFFICE O/P EST LOW 20 MIN: CPT | Performed by: PHYSICIAN ASSISTANT

## 2022-06-10 PROCEDURE — 3017F COLORECTAL CA SCREEN DOC REV: CPT | Performed by: PHYSICIAN ASSISTANT

## 2022-06-10 PROCEDURE — G8417 CALC BMI ABV UP PARAM F/U: HCPCS | Performed by: PHYSICIAN ASSISTANT

## 2022-06-10 PROCEDURE — G8427 DOCREV CUR MEDS BY ELIG CLIN: HCPCS | Performed by: PHYSICIAN ASSISTANT

## 2022-06-10 PROCEDURE — 4004F PT TOBACCO SCREEN RCVD TLK: CPT | Performed by: PHYSICIAN ASSISTANT

## 2022-06-11 NOTE — PROGRESS NOTES
This dictation was done with Dragon dictation and may contain mechanical errors related to translation. I have today reviewed with Tawanna Ormond the clinically relevant, past medical history, medications, allergies, family history, social history, and Review Of Systems form the patients most recent history form & I have documented any details relevant to today's presenting complaints in my history below. Ms. Atul Sutherland's self-reported past medical history, medications, allergies, family history, social history, and Review Of Systems form has been scanned into the chart under the \"Media\" tab. Subjective: Tawanna Ormond is a 49 y.o. who is here complaining of pain in the ball of her foot. She does remember specific injury has been getting worse over the last few weeks. She hurts when walking without shoes or support. She was sent for x-rays including AP and lateral and oblique of her right foot. She comes to [facility] after-hours [facility] clinic for evaluation and treatment.       Patient Active Problem List   Diagnosis    Lateral epicondylitis of left elbow    Elbow pain, left    Numbness and tingling in left hand    Forearm strain, right, initial encounter    Left carpal tunnel syndrome    Degeneration of lumbar or lumbosacral intervertebral disc    Lumbosacral spondylosis without myelopathy    Displacement of lumbar intervertebral disc without myelopathy    Lumbar spondylosis    Right wrist tendinitis    Chronic calculous cholecystitis    Kidney stone    Nausea and vomiting    Other hydronephrosis    Contusion of left forearm    Sprain of right wrist           Current Outpatient Medications on File Prior to Visit   Medication Sig Dispense Refill    Probiotic Product (PROBIOTIC PO) Take by mouth      metFORMIN (GLUCOPHAGE) 500 MG tablet Take 500 mg by mouth 3 times daily      atorvastatin (LIPITOR) 40 MG tablet Take 40 mg by mouth daily       No current facility-administered medications on file prior to visit. Objective:   Height 5' 1\" (1.549 m), weight 187 lb (84.8 kg), not currently breastfeeding. On examination is a very pleasant 49 old female who is alert and orient x3 overall cannot see anything on the x-rays her tenderness is in the metatarsal heads and the metatarsal pad at the base of the foot. There is mild swelling but no neurological deficits she is got good symmetric motion through the ankle with good eversion inversion strength little tight on dorsiflexion and tightness to her heel cord present. Neuro exam grossly intact both lower extremities. Intact sensation to light touch. Motor exam 4+ to 5/5 in all major motor groups. Negative Bates's sign. Skin is warm, dry and intact with out erythema or significant increased temperature around the knee joint(s). There are no cutaneous lesions or lymphadenopathy present. X-RAYS:  The x-rays taken the office today shows shows no significant bone abnormalities some normal degenerative changes but no fracture seen and this was shown to the patient      Assessment:  Right foot metatarsalgia pain    Plan:  During today's visit, there was approximately 25 minutes of face-to-face discussion in regards to the patient's current condition and treatment options. More than 50 % of the time was counseling and coordination of care as indicated above.   At this point we talked our short long-term expectations and with her consent she was is given a prescription for physical therapy we talked about inserts anti-inflammatories and she will follow-up with Dr. Dillan Alatorre in 1 to 2 weeks      PROCEDURE NOTE:   Prescribed physical therapy for metatarsalgia      They will schedule a follow up in 1 to 2 weeks

## 2022-06-19 SDOH — HEALTH STABILITY: PHYSICAL HEALTH: ON AVERAGE, HOW MANY DAYS PER WEEK DO YOU ENGAGE IN MODERATE TO STRENUOUS EXERCISE (LIKE A BRISK WALK)?: 7 DAYS

## 2022-06-19 SDOH — HEALTH STABILITY: PHYSICAL HEALTH: ON AVERAGE, HOW MANY MINUTES DO YOU ENGAGE IN EXERCISE AT THIS LEVEL?: 20 MIN

## 2022-06-19 ASSESSMENT — SOCIAL DETERMINANTS OF HEALTH (SDOH)
WITHIN THE LAST YEAR, HAVE YOU BEEN AFRAID OF YOUR PARTNER OR EX-PARTNER?: PATIENT DECLINED
WITHIN THE LAST YEAR, HAVE YOU BEEN KICKED, HIT, SLAPPED, OR OTHERWISE PHYSICALLY HURT BY YOUR PARTNER OR EX-PARTNER?: PATIENT DECLINED
WITHIN THE LAST YEAR, HAVE TO BEEN RAPED OR FORCED TO HAVE ANY KIND OF SEXUAL ACTIVITY BY YOUR PARTNER OR EX-PARTNER?: NO
WITHIN THE LAST YEAR, HAVE YOU BEEN HUMILIATED OR EMOTIONALLY ABUSED IN OTHER WAYS BY YOUR PARTNER OR EX-PARTNER?: PATIENT DECLINED

## 2022-06-21 ENCOUNTER — OFFICE VISIT (OUTPATIENT)
Dept: ORTHOPEDIC SURGERY | Age: 52
End: 2022-06-21
Payer: COMMERCIAL

## 2022-06-21 VITALS — BODY MASS INDEX: 31.72 KG/M2 | HEIGHT: 61 IN | WEIGHT: 168 LBS

## 2022-06-21 DIAGNOSIS — M19.079 ARTHRITIS OF MIDFOOT: ICD-10-CM

## 2022-06-21 DIAGNOSIS — G57.61 MORTON'S NEUROMA OF SECOND INTERSPACE OF RIGHT FOOT: Primary | ICD-10-CM

## 2022-06-21 PROCEDURE — 99213 OFFICE O/P EST LOW 20 MIN: CPT | Performed by: ORTHOPAEDIC SURGERY

## 2022-06-21 PROCEDURE — G8427 DOCREV CUR MEDS BY ELIG CLIN: HCPCS | Performed by: ORTHOPAEDIC SURGERY

## 2022-06-21 PROCEDURE — G8417 CALC BMI ABV UP PARAM F/U: HCPCS | Performed by: ORTHOPAEDIC SURGERY

## 2022-06-21 NOTE — PROGRESS NOTES
[facility]  Outpatient Progress Note  Christopher Lomeli MD    Patient Name: Luz Winchester MRN: [id number]   Age: 49 y.o. YOB: 1970   Sex: female      [address] Complaint   Patient presents with    Foot Pain     Right foot [facility] follow up pain ball of foot  h/o sesamoid fracture seen 6/11 has xrays    Foot Pain     Left foot pain anterior forefoot midfoot when steps down onto foot no imaging        HISTORY OF PRESENT ILLNESS   Luz Winchester is a 49 y.o. female referred by Binu ERICKSON for orthopedic consultation regarding bilateral foot pain. Patient reports she has been experiencing pain in her right forefoot for quite some time. She experiences a sharp shooting electric type pain between her second and third toes particularly if she is walking in her bare feet. Left foot has been bothering her intermittently for quite some time as well with an aching pain which she feels in the lateral midfoot particularly when going downstairs. Pain symptoms again worse in bare feet and in supportive athletic shoes. There has been no specific injury.     PAST MEDICAL HISTORY      Past Medical History:   Diagnosis Date    Degeneration of lumbar or lumbosacral intervertebral disc 11/26/2018    Lateral epicondylitis of left elbow 10/9/2018    Left carpal tunnel syndrome 11/19/2018    Menorrhagia 9/2011       PAST SURGICAL HISTORY     Past Surgical History:   Procedure Laterality Date    BREAST BIOPSY      CHOLECYSTECTOMY  01/07/2020    LAPAROSCOPIC CHOLECYSTECTOMY     CHOLECYSTECTOMY, LAPAROSCOPIC N/A 1/7/2020    LAPAROSCOPIC CHOLECYSTECTOMY performed by Cornel Crisostomo MD at [address] [facility] / [facility] [place] Left 2/24/2020    CYSTOSCOPY, URETEROSCOPY WITH  HOLMIUM LASER LITHOTRIPSY, STENT PLACEMENT, LEFT RETROGRADE, STONE EXTRACTION performed by Gricelda Robbins MD at [place] [address] Bilateral 1/10/2019    BILATERAL LUMBAR THREE, LUMBAR FOUR, LUMBAR FIVE RADIOFREQUENCY ABLATION SITE CONFIRMED BY FLUOROSCOPY performed by Leonard Wilson MD at [address] HYSTEROSCOPY  09/27/2011    Dilation and Curetatge novasure ablation    OTHER SURGICAL HISTORY  2019    Stem cell injection to lower back     OTHER SURGICAL HISTORY  02/24/2020    cystoscopy, ureteroscopy with Holmium laser lithotripsy, stent placement, left retrograde    TONSILLECTOMY      WISDOM TOOTH EXTRACTION  1987    WRIST SURGERY Right 09/13/2021    Dr. Garrett Benitez     Current Outpatient Medications   Medication Sig Dispense Refill    Probiotic Product (PROBIOTIC PO) Take by mouth      metFORMIN (GLUCOPHAGE) 500 MG tablet Take 500 mg by mouth 3 times daily      atorvastatin (LIPITOR) 40 MG tablet Take 40 mg by mouth daily       No current facility-administered medications for this visit.        ALLERGIES     Allergies   Allergen Reactions    Lactose Intolerance (Gi)        FAMILY HISTORY     Family History   Problem Relation Age of Onset    Cancer Mother         Breast    No Known Problems Father     No Known Problems Sister     No Known Problems Brother     No Known Problems Maternal Aunt     No Known Problems Maternal Uncle     No Known Problems Paternal Aunt     No Known Problems Paternal Uncle     No Known Problems Maternal Grandmother     No Known Problems Maternal Grandfather     No Known Problems Paternal Grandmother     No Known Problems Paternal Grandfather     No Known Problems Other     Anesth Problems Neg Hx     Broken Bones Neg Hx     Clotting Disorder Neg Hx     Collagen Disease Neg Hx     Diabetes Neg Hx     Dislocations Neg Hx     Osteoporosis Neg Hx     Rheumatologic Disease Neg Hx     Scoliosis Neg Hx     Severe Sprains Neg Hx        SOCIAL HISTORY     Social History     Socioeconomic History    Marital status:      Spouse name: None    Number of children: None    Years of education: None    Highest education level: None   Occupational History    Occupation: [employer]     Employer: [address] [place]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   Tobacco Use    Smoking status: Current Every Day Smoker     Packs/day: 0.50     Years: 20.00     Pack years: 10.00     Types: Cigarettes     Start date: 2002    Smokeless tobacco: Never Used   Vaping Use    Vaping Use: Never used   Substance and Sexual Activity    Alcohol use: No    Drug use: No    Sexual activity: Yes     Partners: Male   Other Topics Concern    None   Social History Narrative    None     Social Determinants of Health     Financial Resource Strain:     Difficulty of Paying Living Expenses: Not on file   Food Insecurity:     Worried About Running Out of Food in the Last Year: Not on file    Mumtaz of Food in the Last Year: Not on file   Transportation Needs:     Lack of Transportation (Medical): Not on file    Lack of Transportation (Non-Medical):  Not on file   Physical Activity: Insufficiently Active    Days of Exercise per Week: 7 days    Minutes of Exercise per Session: 20 min   Stress:     Feeling of Stress : Not on file   Social Connections:     Frequency of Communication with Friends and Family: Not on file    Frequency of Social Gatherings with Friends and Family: Not on file    Attends Holiness Services: Not on file    Active Member of Clubs or Organizations: Not on file    Attends Club or Organization Meetings: Not on file    Marital Status: Not on file   Intimate Partner Violence: Unknown    Fear of Current or Ex-Partner: Patient refused    Emotionally Abused: Patient refused    Physically Abused: Patient refused    Sexually Abused: No   Housing Stability:     Unable to Pay for Housing in the Last Year: Not on file    Number of [place] in the Last Year: Not on file    Unstable Housing in the Last Year: Not on file       REVIEW OF SYSTEMS   General: no fever, chills, night sweats, anorexia, malaise, fatigue, or weight change  Hematologic:  no unexplained bleeding or bruising  HEENT:   no nasal congestion, rhinorrhea, sore throat, or facial pain  Respiratory:  no cough, dyspnea, or chest pain  Cardiovascular:  no angina, TAFOYA, PND, orthopnea, dependent edema, or palpitations  Gastrointestinal:  no nausea, vomiting, diarrhea, constipation, or abdominal pain  Genitourinary:  no urinary urgency, frequency, dysuria, or hematuria  Musculoskeletal: see HPI  Endocrine:  no heat or cold intolerance and no polyphagia, polydipsia, or polyuria  Skin:  no skin eruptions or changing lesions  Neurologic:  no focal weakness, numbness/tingling, tremor, or severe headache. See HPI. See HPI for pertinent positives. PHYSICAL EXAM   Vital Signs:   Vitals:    06/21/22 1537   Weight: 168 lb (76.2 kg)   Height: 5' 1\" (1.549 m)       General appearance: healthy, alert, no distress  Skin: Skin color, texture, turgor normal. No rashes or lesions  HEENT: atraumatic, normocephalic. PERRL  Respiratory: Unlabored breathing  Lymphatic: No adenopathy   Neuro: Alert and oriented, normal distal sensation, normal bilateral DTRs  Vascular: Normal distal capillary and distal pulses  Muskuloskeletal Exam:  Right foot has exquisite tenderness in the second intermetatarsal space and increased pain with metatarsal head compression. No skin lesions or callosities and no lesser toe deformities. The left foot has nonspecific discomfort to palpation across the dorsal lateral midfoot at the TMT's. No other abnormalities noted on examination    RADIOLOGY   X-rays obtained and reviewed in office:  Views bilateral feet 3 views each    Impression: None mild degenerative changes noted in the midfoot. No other acute abnormality    IMPRESSION     1. 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  Arthritis of midfoot         PLAN   I had a lengthy discussion with patient today regarding diagnosis and treatment options and recommendations. She has declined injection of the second and metatarsal space today rather we will concentrate on shoewear modification. We will fit her for a metatarsal pad. She will begin some stretching exercises and is encouraged to wear supportive structured accommodative shoes and avoid barefoot walking. We briefly discussed possibility of excision of the Emerson's neuroma should her symptoms continue or worsen    FOLLOWUP     No follow-ups on file. Orders Placed This Encounter   Procedures    XR FOOT LEFT (MIN 3 VIEWS)     Standing Status:   Future     Number of Occurrences:   1     Standing Expiration Date:   6/21/2023     Order Specific Question:   Reason for exam:     Answer:   pain      No orders of the defined types were placed in this encounter.       Patient was instructed on appropriate use of braces, participation in home exercise programs, healthy lifestyle choices and weight loss as appropriate     Artist MD Vanessa

## 2022-06-21 NOTE — PATIENT INSTRUCTIONS
Patient Education        Emerson's Neuroma: Care Instructions  Your Care Instructions  When your toes are squeezed together, often over a period of months or even years, the nerve that runs between the toes can swell and get thicker. This is called a Emerson's neuroma. It may feel like a small lump is pushing inside the ball of your foot. When you walk or move your toes, you feel pain that sometimes moves into your toes. If the pressure continues, it may damage thenerve. If you catch the problem early and change your shoes, the nerve swelling may go away. Your doctor may advise you to wear wide-toed shoes. He or she also may suggest that you ice the sore spot and limit activities that put pressure on the nerve. If these steps do not help your symptoms, your doctor may have you use special pads or devices that spread the toes. This keeps them from squeezing the nerve. In some cases, you may get a cortisone shot to reduce swelling and pain. If these treatments don't help, your doctor may suggestsurgery to relieve pressure or remove the swollen nerve. Follow-up care is a key part of your treatment and safety. Be sure to make and go to all appointments, and call your doctor if you are having problems. It's also a good idea to know your test results and keep alist of the medicines you take. How can you care for yourself at home?  Ask your doctor if you can take an over-the-counter pain medicine, such as acetaminophen (Tylenol), ibuprofen (Advil, Motrin), or naproxen (Aleve). Be safe with medicines. Read and follow all instructions on the label.  Try to stay off your feet as much as possible until the pain and swelling go away.  Avoid wearing tight, pointy, or high-heeled shoes. Instead, wear roomy footwear.  Put ice or a cold pack on the area for 10 to 20 minutes at a time. Put a thin cloth between the ice and your skin.  Try massaging your feet. This relaxes the muscles around the nerve.    If your doctor prescribed special pads or a device to relieve pressure on your toes, use these items as directed.  Until all pain and swelling go away, avoid activities that put pressure on the toes. These include racquet sports and running. When should you call for help? Watch closely for changes in your health, and be sure to contact your doctor if:     You do not get better as expected. Where can you learn more? Go to https://chpepiceweb.FirstRain. org and sign in to your [employer] account. Enter E100 in the [employer] box to learn more about \"Emerson's Neuroma: Care Instructions. \"     If you do not have an account, please click on the \"Sign Up Now\" link. Current as of: March 9, 2022               Content Version: 13.3  © 2006-2022 [employer]. Care instructions adapted under license by [facility] [address] If you have questions about a medical condition or this instruction, always ask your healthcare professional. [place] [address] any warranty or liability for your use of this information. Patient Education        Foot Arthritis: Exercises  Introduction  Here are some examples of exercises for you to try. The exercises may be suggested for a condition or for rehabilitation. Start each exercise slowly. Ease off the exercises if you start to have pain. You will be told when to start these exercises and which ones will work bestfor you. How to do the exercises  Calf stretch (knees straight)    1. Place a book on the floor a few inches from a wall or countertop, and put the balls of your feet on it. Your heels should be on the floor. The book needs to be thick enough so that you can feel a gentle stretch in your calf. If you are not steady on your feet, hold on to a chair, counter, or wall while you do this stretch. 2. Keep your knees straight, and lean forward until you feel a stretch in your calf.   3. To get more stretch, add another book or use a thicker book, such as a phone book, a dictionary, or an encyclopedia. 4. Hold the stretch for at least 15 to 30 seconds. 5. Repeat 2 to 4 times. Calf stretch (knees bent)    1. Place a book on the floor a few inches from a wall or countertop, and put the balls of your feet on it. Your heels should be on the floor. The book needs to be thick enough so that you can feel a gentle stretch in your calf. If you are not steady on your feet, hold on to a chair, counter, or wall while you do this stretch. 2. Bend your knees, and lean forward until you feel a stretch in your calf. 3. To get more stretch, add another book or use a thicker book, such as a phone book, a dictionary, or an encyclopedia. 4. Hold the stretch for at least 15 to 30 seconds. 5. Repeat 2 to 4 times. Great toe extension stretch    1. Sit in a chair, and put your affected foot across your other knee. 2. Grasp your heel with one hand and then slowly pull your big toe back with your other hand. Pull your toe back toward your ankle until you feel a stretch along the bottom of your foot. 3. Hold the stretch for at least 15 to 30 seconds. 4. Repeat 2 to 4 times. 5. Switch feet and repeat steps 1 through 4, even if only one foot is sore. Great toe flexion stretch    1. Sit in a chair, and put your affected foot across your other knee. 2. Grasp your heel with one hand and then slowly push your big toe down with your other hand. Push your toe down and away from your ankle until you feel a stretch along the top of your foot. 3. Hold the stretch for at least 15 to 30 seconds. 4. Repeat 2 to 4 times. 5. Switch feet and repeat steps 1 through 4, even if only one foot is sore. Ankle alphabet    1. Sit in a chair with your feet flat on the floor. (You can also do this exercise lying on your back with your affected leg propped up on a pillow). 2. Lift the heel of your sore foot off the floor, and slowly trace the letters of the alphabet.   3. Switch feet and repeat steps 1 through 2, even if only one foot is sore. Resisted ankle dorsiflexion    1. Tie the ends of an exercise band together to form a loop. Attach one end of the loop to a secure object or shut a door on it to hold it in place. (Or you can have someone hold one end of the loop to provide resistance.)  2. While sitting on the floor or in a chair, loop the other end of the band over the top of your affected foot. 3. Keeping your knee and leg straight, slowly flex your foot to pull back on the exercise band, and then slowly relax. 4. Repeat 8 to 12 times. 5. Switch feet and repeat steps 1 through 4, even if only one foot is sore. Towel curl    1. While sitting, place your affected foot on a towel on the floor, and scrunch the towel toward you with your toes. 2. Then use your toes to push the towel away from you. 3. Repeat 8 to 12 times. 4. Switch feet and repeat steps 1 through 3, even if only one foot is sore. Resisted ankle eversion    1. Sit on the floor with your legs straight. 2. Hold both ends of an exercise band and loop the band around the outside of your affected foot. Then press your other foot against the band. 3. Keeping your leg straight, slowly push your affected foot outward against the band and away from your other foot without letting your leg rotate. Then slowly relax. 4. Repeat 8 to 12 times. 5. Switch feet and repeat steps 1 through 4, even if only one foot is sore. Resisted ankle inversion    1. Sit on the floor with your good leg crossed over your other leg. 2. Hold both ends of an exercise band and loop the band around the inside of your affected foot. Then press your other foot against the band. 3. Keeping your legs crossed, slowly push your affected foot against the band so that foot moves away from your other foot. Then slowly relax. 4. Repeat 8 to 12 times. 5. Switch feet and repeat steps 1 through 4, even if only one foot is sore.   Follow-up care is a key part of your treatment and safety. Be sure to make and go to all appointments, and call your doctor if you are having problems. It's also a good idea to know your test results and keep alist of the medicines you take. Where can you learn more? Go to https://hemanth.Volt. org and sign in to your [employer] account. Enter K113 in the [employer] box to learn more about \"Foot Arthritis: Exercises. \"     If you do not have an account, please click on the \"Sign Up Now\" link. Current as of: March 9, 2022               Content Version: 13.3  © 7450-0532 [employer]. Care instructions adapted under license by [facility] ([facility]). If you have questions about a medical condition or this instruction, always ask your healthcare professional. [address] any warranty or liability for your use of this information.

## 2022-07-07 ENCOUNTER — HOSPITAL ENCOUNTER (OUTPATIENT)
Dept: PHYSICAL THERAPY | Age: 52
Setting detail: THERAPIES SERIES
Discharge: HOME | End: 2022-07-07

## 2022-11-15 ENCOUNTER — HOSPITAL ENCOUNTER (OUTPATIENT)
Dept: MAMMOGRAPHY | Age: 52
Discharge: HOME | End: 2022-11-15
Payer: COMMERCIAL

## 2022-11-15 DIAGNOSIS — Z12.39 SCREENING BREAST EXAMINATION: ICD-10-CM

## 2022-11-15 PROCEDURE — 77063 BREAST TOMOSYNTHESIS BI: CPT

## 2022-11-16 ENCOUNTER — TELEPHONE (OUTPATIENT)
Dept: MAMMOGRAPHY | Age: 52
End: 2022-11-16

## 2023-12-02 ENCOUNTER — HOSPITAL ENCOUNTER (OUTPATIENT)
Dept: MAMMOGRAPHY | Age: 53
Discharge: HOME | End: 2023-12-02
Payer: COMMERCIAL

## 2023-12-02 VITALS — WEIGHT: 155 LBS | HEIGHT: 61 IN | BODY MASS INDEX: 29.27 KG/M2

## 2023-12-02 DIAGNOSIS — Z12.31 SCREENING MAMMOGRAM FOR BREAST CANCER: ICD-10-CM

## 2023-12-02 PROCEDURE — 77063 BREAST TOMOSYNTHESIS BI: CPT

## 2024-04-11 ENCOUNTER — HOSPITAL ENCOUNTER (OUTPATIENT)
Age: 54
Discharge: HOME | End: 2024-04-11
Payer: COMMERCIAL

## 2024-04-11 LAB
ALBUMIN SERPL-MCNC: 4.4 G/DL (ref 3.4–5)
ALBUMIN/GLOB SERPL: 1.7 {RATIO} (ref 1.1–2.2)
ALP SERPL-CCNC: 92 U/L (ref 40–129)
ALT SERPL-CCNC: 20 U/L (ref 10–40)
ANION GAP SERPL CALCULATED.3IONS-SCNC: 10 MMOL/L (ref 3–16)
AST SERPL-CCNC: 22 U/L (ref 15–37)
BASOPHILS # BLD: 0 K/UL (ref 0–0.2)
BASOPHILS NFR BLD: 0.6 %
BILIRUB SERPL-MCNC: 0.3 MG/DL (ref 0–1)
BUN SERPL-MCNC: 17 MG/DL (ref 7–20)
CALCIUM SERPL-MCNC: 9.8 MG/DL (ref 8.3–10.6)
CHLORIDE SERPL-SCNC: 107 MMOL/L (ref 99–110)
CO2 SERPL-SCNC: 26 MMOL/L (ref 21–32)
CREAT SERPL-MCNC: <0.5 MG/DL (ref 0.6–1.1)
DEPRECATED RDW RBC AUTO: 14.1 % (ref 12.4–15.4)
EOSINOPHIL # BLD: 0.1 K/UL (ref 0–0.6)
EOSINOPHIL NFR BLD: 1.2 %
GFR SERPLBLD CREATININE-BSD FMLA CKD-EPI: >90 ML/MIN/{1.73_M2}
GLUCOSE P FAST SERPL-MCNC: 104 MG/DL (ref 70–99)
HCT VFR BLD AUTO: 42.6 % (ref 36–48)
HGB BLD-MCNC: 14.1 G/DL (ref 12–16)
LYMPHOCYTES # BLD: 2.4 K/UL (ref 1–5.1)
LYMPHOCYTES NFR BLD: 30.1 %
MCH RBC QN AUTO: 25.2 PG (ref 26–34)
MCHC RBC AUTO-ENTMCNC: 33 G/DL (ref 31–36)
MCV RBC AUTO: 76.4 FL (ref 80–100)
MONOCYTES # BLD: 0.4 K/UL (ref 0–1.3)
MONOCYTES NFR BLD: 5.3 %
NEUTROPHILS # BLD: 4.9 K/UL (ref 1.7–7.7)
NEUTROPHILS NFR BLD: 62.8 %
PLATELET # BLD AUTO: 321 K/UL (ref 135–450)
PMV BLD AUTO: 7.2 FL (ref 5–10.5)
POTASSIUM SERPL-SCNC: 4.5 MMOL/L (ref 3.5–5.1)
PROT SERPL-MCNC: 7 G/DL (ref 6.4–8.2)
RBC # BLD AUTO: 5.58 M/UL (ref 4–5.2)
SODIUM SERPL-SCNC: 143 MMOL/L (ref 136–145)
WBC # BLD AUTO: 7.9 K/UL (ref 4–11)

## 2024-04-11 PROCEDURE — 80053 COMPREHEN METABOLIC PANEL: CPT

## 2024-04-11 PROCEDURE — 83036 HEMOGLOBIN GLYCOSYLATED A1C: CPT

## 2024-04-11 PROCEDURE — 80061 LIPID PANEL: CPT

## 2024-04-11 PROCEDURE — 85025 COMPLETE CBC W/AUTO DIFF WBC: CPT

## 2024-04-12 LAB
CHOLEST SERPL-MCNC: 127 MG/DL (ref 0–199)
EST. AVERAGE GLUCOSE BLD GHB EST-MCNC: 125.5 MG/DL
HBA1C MFR BLD: 6 %
HDLC SERPL-MCNC: 46 MG/DL (ref 40–60)
LDL CHOLESTEROL CALCULATED: 61 MG/DL
TRIGL SERPL-MCNC: 98 MG/DL (ref 0–150)
VLDLC SERPL CALC-MCNC: 20 MG/DL

## 2025-02-11 ENCOUNTER — TELEPHONE (OUTPATIENT)
Dept: NEUROLOGY | Age: 55
End: 2025-02-11

## 2025-08-05 ENCOUNTER — HOSPITAL ENCOUNTER (OUTPATIENT)
Dept: LAB | Age: 55
Discharge: HOME | End: 2025-08-05
Payer: COMMERCIAL

## 2025-08-05 LAB
25(OH)D3 SERPL-MCNC: 23.6 NG/ML
FOLATE SERPL-MCNC: 13.1 NG/ML (ref 4.78–24.2)
TSH SERPL DL<=0.005 MIU/L-ACNC: 0.63 UIU/ML (ref 0.27–4.2)
VIT B12 SERPL-MCNC: 325 PG/ML (ref 211–911)

## 2025-08-05 PROCEDURE — 82746 ASSAY OF FOLIC ACID SERUM: CPT

## 2025-08-05 PROCEDURE — 86334 IMMUNOFIX E-PHORESIS SERUM: CPT

## 2025-08-05 PROCEDURE — 84207 ASSAY OF VITAMIN B-6: CPT

## 2025-08-05 PROCEDURE — 84165 PROTEIN E-PHORESIS SERUM: CPT

## 2025-08-05 PROCEDURE — 84443 ASSAY THYROID STIM HORMONE: CPT

## 2025-08-05 PROCEDURE — 82306 VITAMIN D 25 HYDROXY: CPT

## 2025-08-05 PROCEDURE — 84155 ASSAY OF PROTEIN SERUM: CPT

## 2025-08-05 PROCEDURE — 82607 VITAMIN B-12: CPT

## 2025-08-05 PROCEDURE — 86780 TREPONEMA PALLIDUM: CPT

## 2025-08-06 LAB
ALBUMIN SERPL ELPH-MCNC: 3.3 G/DL (ref 3.1–4.9)
ALPHA1 GLOB SERPL ELPH-MCNC: 0.2 G/DL (ref 0.2–0.4)
ALPHA2 GLOB SERPL ELPH-MCNC: 0.8 G/DL (ref 0.4–1.1)
B-GLOBULIN SERPL ELPH-MCNC: 1.2 G/DL (ref 0.9–1.6)
GAMMA GLOB SERPL ELPH-MCNC: 0.9 G/DL (ref 0.6–1.8)
PROT SERPL-MCNC: 6.4 G/DL (ref 6.4–8.2)
REAGIN+T PALLIDUM IGG+IGM SERPL-IMP: NORMAL
SPE/IFE INTERPRETATION: NORMAL

## 2025-08-09 LAB — VIT B6 SERPL-MCNC: 23.1 NMOL/L (ref 20–125)

## (undated) DEVICE — SYRINGE MED 10ML LUERLOCK TIP W/O SFTY DISP

## (undated) DEVICE — GLOVE ORANGE PI 8 1/2   MSG9085

## (undated) DEVICE — ELECTRODE PT RET AD L9FT HI MOIST COND ADH HYDRGEL CORDED

## (undated) DEVICE — Z DUP USE 2139333 GUIDEWIRE UROLOGICAL STR STD 0.035 IN X150 CM REG ZIPWIRE LF

## (undated) DEVICE — SOLUTION IV IRRIG 500ML 0.9% SODIUM CHL 2F7123

## (undated) DEVICE — APPLICATOR PREP 26ML 0.7% IOD POVACRYLEX 74% ISO ALC ST

## (undated) DEVICE — GOWN SIRUS NONREIN LG W/TWL: Brand: MEDLINE INDUSTRIES, INC.

## (undated) DEVICE — STERILE POLYISOPRENE POWDER-FREE SURGICAL GLOVES: Brand: PROTEXIS

## (undated) DEVICE — ALCOHOL RUBBING 16OZ 70% ISO

## (undated) DEVICE — DRAPE,ABDOMINAL,MAJOR,STERILE: Brand: MEDLINE

## (undated) DEVICE — PREP SOL PVP IODINE 4%  4 OZ/BTL

## (undated) DEVICE — NEEDLE INSUF L120MM DIA2MM DISP FOR PNEUMOPERI ENDOPATH

## (undated) DEVICE — STONE RETRIEVAL BASKET: Brand: SEGURA HEMISPHERE

## (undated) DEVICE — TUBING INSUF ISO CONN DISP

## (undated) DEVICE — YANKAUER,BULB TIP,W/O VENT,RIGID,STERILE: Brand: MEDLINE

## (undated) DEVICE — APPLIER CLP M L L11.4IN DIA10MM ENDOSCP ROT MULT FOR LIG

## (undated) DEVICE — BAG URIN STRL FOR URO CTCH SYS

## (undated) DEVICE — MEDI-VAC NON-CONDUCTIVE SUCTION TUBING: Brand: CARDINAL HEALTH

## (undated) DEVICE — 3M™ TEGADERM™ TRANSPARENT FILM DRESSING FRAME STYLE, 1624W, 2-3/8 IN X 2-3/4 IN (6 CM X 7 CM), 100/CT 4CT/CASE: Brand: 3M™ TEGADERM™

## (undated) DEVICE — SUTURE VCRL SZ 4-0 L18IN ABSRB UD L19MM PS-2 3/8 CIR PRIM J496H

## (undated) DEVICE — MAJOR SET UP PK

## (undated) DEVICE — BOWL MED L 32OZ PLAS W/ MOLD GRAD EZ OPN PEEL PCH

## (undated) DEVICE — UNIVERSAL BLOCK TRAY: Brand: MEDLINE INDUSTRIES, INC.

## (undated) DEVICE — CIRCUIT ANES L72IN 3L BACT AND VIR FLTR EL CONN SGL LIMB

## (undated) DEVICE — CYSTO/BLADDER IRRIGATION SET, REGULATING CLAMP

## (undated) DEVICE — GLOVE ORANGE PI 7 1/2   MSG9075

## (undated) DEVICE — TROCAR ENDOSCP L100MM DIA5MM BLDELSS STBL SL THRD OPT VW

## (undated) DEVICE — GOWN,AURORA,NONREINF,RAGLAN,XXL,STERILE: Brand: MEDLINE

## (undated) DEVICE — SUTURE SZ 0 27IN 5/8 CIR UR-6  TAPER PT VIOLET ABSRB VICRYL J603H

## (undated) DEVICE — GAUZE,SPONGE,4"X4",16PLY,STRL,LF,10/TRAY: Brand: MEDLINE

## (undated) DEVICE — TROCAR ENDOSCP L100MM DIA11MM STBL SL BLDELSS DISP ENDOPATH

## (undated) DEVICE — PUMP SUC IRR TBNG L10FT W/ HNDPC ASSEMB STRYKEFLOW 2

## (undated) DEVICE — INVIEW CLEAR LEGGINGS: Brand: CONVERTORS

## (undated) DEVICE — KIT,ANTI FOG,W/SPONGE & FLUID,SOFT PACK: Brand: MEDLINE

## (undated) DEVICE — DBD-PACK,CYSTOSCOPY,PK VI,AURORA: Brand: MEDLINE

## (undated) DEVICE — TUBING, SUCTION, 3/16" X 10', STRAIGHT: Brand: MEDLINE

## (undated) DEVICE — TROCAR ENDOSCP L100MM DIA5MM BLDELSS STBL SL OBT RADLUC

## (undated) DEVICE — Z DUP USE 2522782 SOLUTION IRRIG 1000ML STRL H2O PLAS CONTAINER UROMATIC

## (undated) DEVICE — ENDOSCOPIC VALVE WITH ADAPTER.: Brand: SURSEAL® II

## (undated) DEVICE — 3M™ STERI-STRIP™ COMPOUND BENZOIN TINCTURE 40 BAGS/CARTON 4 CARTONS/CASE C1544: Brand: 3M™ STERI-STRIP™

## (undated) DEVICE — STANDARD HYPODERMIC NEEDLE,POLYPROPYLENE HUB: Brand: MONOJECT

## (undated) DEVICE — INTENDED FOR TISSUE SEPARATION, AND OTHER PROCEDURES THAT REQUIRE A SHARP SURGICAL BLADE TO PUNCTURE OR CUT.: Brand: BARD-PARKER ® STAINLESS STEEL BLADES

## (undated) DEVICE — TOWEL OR BLUEE 16X26IN ST 8 PACK ORB08 16X26ORTWL

## (undated) DEVICE — GAUZE,SPONGE,4"X4",8PLY,STRL,LF,10/TRAY: Brand: MEDLINE

## (undated) DEVICE — Z CONVERTED USE 2271043 CONTAINER SPEC COLL 4OZ SCR ON LID PEEL PCH

## (undated) DEVICE — CATHETER URET 5FR L70CM TIP 8FR OPN END CONE TIP INJ HUB

## (undated) DEVICE — CO2 INSUFFLATION NEEDLE: Brand: CORE DYNAMICS

## (undated) DEVICE — CORD ES L10FT MPLR LAP

## (undated) DEVICE — GAUZE SPONGES,8 PLY: Brand: CURITY